# Patient Record
Sex: MALE | NOT HISPANIC OR LATINO | Employment: FULL TIME | URBAN - METROPOLITAN AREA
[De-identification: names, ages, dates, MRNs, and addresses within clinical notes are randomized per-mention and may not be internally consistent; named-entity substitution may affect disease eponyms.]

---

## 2024-11-25 ENCOUNTER — HOSPITAL ENCOUNTER (OUTPATIENT)
Facility: HOSPITAL | Age: 64
Setting detail: OBSERVATION
Discharge: HOME | End: 2024-11-27
Attending: STUDENT IN AN ORGANIZED HEALTH CARE EDUCATION/TRAINING PROGRAM | Admitting: INTERNAL MEDICINE
Payer: COMMERCIAL

## 2024-11-25 ENCOUNTER — APPOINTMENT (OUTPATIENT)
Dept: RADIOLOGY | Facility: HOSPITAL | Age: 64
End: 2024-11-25
Payer: COMMERCIAL

## 2024-11-25 ENCOUNTER — APPOINTMENT (OUTPATIENT)
Dept: CARDIOLOGY | Facility: HOSPITAL | Age: 64
End: 2024-11-25
Payer: COMMERCIAL

## 2024-11-25 DIAGNOSIS — R42 DIZZINESS: ICD-10-CM

## 2024-11-25 DIAGNOSIS — Z78.9 ALCOHOL USE: ICD-10-CM

## 2024-11-25 DIAGNOSIS — R42 LIGHTHEADEDNESS: Primary | ICD-10-CM

## 2024-11-25 DIAGNOSIS — E87.1 HYPONATREMIA: ICD-10-CM

## 2024-11-25 DIAGNOSIS — R03.0 BLOOD PRESSURE ELEVATED WITHOUT HISTORY OF HTN: ICD-10-CM

## 2024-11-25 LAB
ALBUMIN SERPL BCP-MCNC: 4.1 G/DL (ref 3.4–5)
ALP SERPL-CCNC: 61 U/L (ref 33–136)
ALT SERPL W P-5'-P-CCNC: 14 U/L (ref 10–52)
ANION GAP SERPL CALC-SCNC: 16 MMOL/L (ref 10–20)
AST SERPL W P-5'-P-CCNC: 20 U/L (ref 9–39)
ATRIAL RATE: 66 BPM
ATRIAL RATE: 68 BPM
ATRIAL RATE: 69 BPM
ATRIAL RATE: 77 BPM
BASOPHILS # BLD AUTO: 0.04 X10*3/UL (ref 0–0.1)
BASOPHILS NFR BLD AUTO: 0.6 %
BILIRUB SERPL-MCNC: 1.2 MG/DL (ref 0–1.2)
BNP SERPL-MCNC: 71 PG/ML (ref 0–99)
BUN SERPL-MCNC: 8 MG/DL (ref 6–23)
CALCIUM SERPL-MCNC: 8.6 MG/DL (ref 8.6–10.3)
CARDIAC TROPONIN I PNL SERPL HS: 7 NG/L (ref 0–20)
CARDIAC TROPONIN I PNL SERPL HS: 8 NG/L (ref 0–20)
CHLORIDE SERPL-SCNC: 100 MMOL/L (ref 98–107)
CO2 SERPL-SCNC: 20 MMOL/L (ref 21–32)
CREAT SERPL-MCNC: 0.69 MG/DL (ref 0.5–1.3)
EGFRCR SERPLBLD CKD-EPI 2021: >90 ML/MIN/1.73M*2
EOSINOPHIL # BLD AUTO: 0.01 X10*3/UL (ref 0–0.7)
EOSINOPHIL NFR BLD AUTO: 0.2 %
ERYTHROCYTE [DISTWIDTH] IN BLOOD BY AUTOMATED COUNT: 11.8 % (ref 11.5–14.5)
GLUCOSE SERPL-MCNC: 135 MG/DL (ref 74–99)
HCT VFR BLD AUTO: 41.2 % (ref 41–52)
HGB BLD-MCNC: 14.7 G/DL (ref 13.5–17.5)
HOLD SPECIMEN: NORMAL
IMM GRANULOCYTES # BLD AUTO: 0.01 X10*3/UL (ref 0–0.7)
IMM GRANULOCYTES NFR BLD AUTO: 0.2 % (ref 0–0.9)
LYMPHOCYTES # BLD AUTO: 0.84 X10*3/UL (ref 1.2–4.8)
LYMPHOCYTES NFR BLD AUTO: 12.9 %
MAGNESIUM SERPL-MCNC: 1.67 MG/DL (ref 1.6–2.4)
MCH RBC QN AUTO: 31.5 PG (ref 26–34)
MCHC RBC AUTO-ENTMCNC: 35.7 G/DL (ref 32–36)
MCV RBC AUTO: 88 FL (ref 80–100)
MONOCYTES # BLD AUTO: 0.43 X10*3/UL (ref 0.1–1)
MONOCYTES NFR BLD AUTO: 6.6 %
NEUTROPHILS # BLD AUTO: 5.16 X10*3/UL (ref 1.2–7.7)
NEUTROPHILS NFR BLD AUTO: 79.5 %
NRBC BLD-RTO: 0 /100 WBCS (ref 0–0)
P AXIS: 22 DEGREES
P AXIS: 24 DEGREES
P AXIS: 40 DEGREES
P AXIS: 49 DEGREES
P OFFSET: 184 MS
P OFFSET: 193 MS
P OFFSET: 198 MS
P OFFSET: 199 MS
P ONSET: 121 MS
P ONSET: 130 MS
P ONSET: 134 MS
P ONSET: 139 MS
PLATELET # BLD AUTO: 240 X10*3/UL (ref 150–450)
POTASSIUM SERPL-SCNC: 3.6 MMOL/L (ref 3.5–5.3)
PR INTERVAL: 166 MS
PR INTERVAL: 170 MS
PR INTERVAL: 174 MS
PR INTERVAL: 178 MS
PROT SERPL-MCNC: 6 G/DL (ref 6.4–8.2)
Q ONSET: 208 MS
Q ONSET: 219 MS
Q ONSET: 219 MS
Q ONSET: 222 MS
QRS COUNT: 11 BEATS
QRS COUNT: 11 BEATS
QRS COUNT: 12 BEATS
QRS COUNT: 13 BEATS
QRS DURATION: 102 MS
QRS DURATION: 108 MS
QRS DURATION: 110 MS
QRS DURATION: 112 MS
QT INTERVAL: 406 MS
QT INTERVAL: 430 MS
QT INTERVAL: 444 MS
QT INTERVAL: 466 MS
QTC CALCULATION(BAZETT): 457 MS
QTC CALCULATION(BAZETT): 459 MS
QTC CALCULATION(BAZETT): 465 MS
QTC CALCULATION(BAZETT): 499 MS
QTC FREDERICIA: 441 MS
QTC FREDERICIA: 448 MS
QTC FREDERICIA: 458 MS
QTC FREDERICIA: 488 MS
R AXIS: -29 DEGREES
R AXIS: -32 DEGREES
R AXIS: -33 DEGREES
R AXIS: -34 DEGREES
RBC # BLD AUTO: 4.67 X10*6/UL (ref 4.5–5.9)
SODIUM SERPL-SCNC: 132 MMOL/L (ref 136–145)
T AXIS: -20 DEGREES
T AXIS: -28 DEGREES
T AXIS: -28 DEGREES
T AXIS: -47 DEGREES
T OFFSET: 425 MS
T OFFSET: 434 MS
T OFFSET: 441 MS
T OFFSET: 441 MS
TSH SERPL-ACNC: 1.38 MIU/L (ref 0.44–3.98)
VENTRICULAR RATE: 66 BPM
VENTRICULAR RATE: 68 BPM
VENTRICULAR RATE: 69 BPM
VENTRICULAR RATE: 77 BPM
WBC # BLD AUTO: 6.5 X10*3/UL (ref 4.4–11.3)

## 2024-11-25 PROCEDURE — 96375 TX/PRO/DX INJ NEW DRUG ADDON: CPT

## 2024-11-25 PROCEDURE — 2500000001 HC RX 250 WO HCPCS SELF ADMINISTERED DRUGS (ALT 637 FOR MEDICARE OP)

## 2024-11-25 PROCEDURE — 80053 COMPREHEN METABOLIC PANEL: CPT

## 2024-11-25 PROCEDURE — 84484 ASSAY OF TROPONIN QUANT: CPT

## 2024-11-25 PROCEDURE — 83735 ASSAY OF MAGNESIUM: CPT

## 2024-11-25 PROCEDURE — 71045 X-RAY EXAM CHEST 1 VIEW: CPT

## 2024-11-25 PROCEDURE — 93005 ELECTROCARDIOGRAM TRACING: CPT

## 2024-11-25 PROCEDURE — 71045 X-RAY EXAM CHEST 1 VIEW: CPT | Performed by: RADIOLOGY

## 2024-11-25 PROCEDURE — 2500000005 HC RX 250 GENERAL PHARMACY W/O HCPCS

## 2024-11-25 PROCEDURE — 83880 ASSAY OF NATRIURETIC PEPTIDE: CPT

## 2024-11-25 PROCEDURE — 2500000001 HC RX 250 WO HCPCS SELF ADMINISTERED DRUGS (ALT 637 FOR MEDICARE OP): Performed by: INTERNAL MEDICINE

## 2024-11-25 PROCEDURE — G0378 HOSPITAL OBSERVATION PER HR: HCPCS

## 2024-11-25 PROCEDURE — 99285 EMERGENCY DEPT VISIT HI MDM: CPT | Performed by: STUDENT IN AN ORGANIZED HEALTH CARE EDUCATION/TRAINING PROGRAM

## 2024-11-25 PROCEDURE — 2500000002 HC RX 250 W HCPCS SELF ADMINISTERED DRUGS (ALT 637 FOR MEDICARE OP, ALT 636 FOR OP/ED)

## 2024-11-25 PROCEDURE — 36415 COLL VENOUS BLD VENIPUNCTURE: CPT

## 2024-11-25 PROCEDURE — 85025 COMPLETE CBC W/AUTO DIFF WBC: CPT

## 2024-11-25 PROCEDURE — 84443 ASSAY THYROID STIM HORMONE: CPT

## 2024-11-25 PROCEDURE — 99222 1ST HOSP IP/OBS MODERATE 55: CPT

## 2024-11-25 PROCEDURE — 96365 THER/PROPH/DIAG IV INF INIT: CPT

## 2024-11-25 PROCEDURE — 96372 THER/PROPH/DIAG INJ SC/IM: CPT | Mod: 59

## 2024-11-25 PROCEDURE — 99285 EMERGENCY DEPT VISIT HI MDM: CPT | Mod: 25

## 2024-11-25 PROCEDURE — 70450 CT HEAD/BRAIN W/O DYE: CPT

## 2024-11-25 PROCEDURE — 93010 ELECTROCARDIOGRAM REPORT: CPT | Performed by: STUDENT IN AN ORGANIZED HEALTH CARE EDUCATION/TRAINING PROGRAM

## 2024-11-25 PROCEDURE — 2500000004 HC RX 250 GENERAL PHARMACY W/ HCPCS (ALT 636 FOR OP/ED)

## 2024-11-25 PROCEDURE — 70450 CT HEAD/BRAIN W/O DYE: CPT | Performed by: RADIOLOGY

## 2024-11-25 RX ORDER — MULTIVIT-MIN/IRON FUM/FOLIC AC 7.5 MG-4
1 TABLET ORAL DAILY
Status: DISCONTINUED | OUTPATIENT
Start: 2024-11-25 | End: 2024-11-27 | Stop reason: HOSPADM

## 2024-11-25 RX ORDER — FAMOTIDINE 10 MG/ML
20 INJECTION INTRAVENOUS ONCE
Status: COMPLETED | OUTPATIENT
Start: 2024-11-25 | End: 2024-11-25

## 2024-11-25 RX ORDER — PSYLLIUM HUSK 0.4 G
1 CAPSULE ORAL DAILY
COMMUNITY

## 2024-11-25 RX ORDER — ENOXAPARIN SODIUM 100 MG/ML
40 INJECTION SUBCUTANEOUS EVERY 24 HOURS
Status: DISCONTINUED | OUTPATIENT
Start: 2024-11-25 | End: 2024-11-27 | Stop reason: HOSPADM

## 2024-11-25 RX ORDER — NAPROXEN SODIUM 220 MG/1
324 TABLET, FILM COATED ORAL ONCE
Status: COMPLETED | OUTPATIENT
Start: 2024-11-25 | End: 2024-11-25

## 2024-11-25 RX ORDER — ONDANSETRON HYDROCHLORIDE 2 MG/ML
4 INJECTION, SOLUTION INTRAVENOUS ONCE
Status: COMPLETED | OUTPATIENT
Start: 2024-11-25 | End: 2024-11-25

## 2024-11-25 RX ORDER — LANOLIN ALCOHOL/MO/W.PET/CERES
100 CREAM (GRAM) TOPICAL DAILY
Status: DISCONTINUED | OUTPATIENT
Start: 2024-11-25 | End: 2024-11-27 | Stop reason: HOSPADM

## 2024-11-25 RX ORDER — LANOLIN ALCOHOL/MO/W.PET/CERES
250 CREAM (GRAM) TOPICAL ONCE
Status: COMPLETED | OUTPATIENT
Start: 2024-11-25 | End: 2024-11-25

## 2024-11-25 RX ORDER — ALUMINUM HYDROXIDE, MAGNESIUM HYDROXIDE, AND SIMETHICONE 1200; 120; 1200 MG/30ML; MG/30ML; MG/30ML
30 SUSPENSION ORAL ONCE
Status: COMPLETED | OUTPATIENT
Start: 2024-11-25 | End: 2024-11-25

## 2024-11-25 RX ORDER — MECLIZINE HYDROCHLORIDE 25 MG/1
25 TABLET ORAL ONCE
Status: COMPLETED | OUTPATIENT
Start: 2024-11-25 | End: 2024-11-25

## 2024-11-25 RX ORDER — ACETAMINOPHEN 325 MG/1
975 TABLET ORAL ONCE
Status: DISCONTINUED | OUTPATIENT
Start: 2024-11-25 | End: 2024-11-25

## 2024-11-25 RX ORDER — LIDOCAINE HYDROCHLORIDE 20 MG/ML
15 SOLUTION OROPHARYNGEAL ONCE
Status: COMPLETED | OUTPATIENT
Start: 2024-11-25 | End: 2024-11-25

## 2024-11-25 RX ORDER — FOLIC ACID 1 MG/1
1 TABLET ORAL DAILY
Status: DISCONTINUED | OUTPATIENT
Start: 2024-11-25 | End: 2024-11-27 | Stop reason: HOSPADM

## 2024-11-25 ASSESSMENT — COGNITIVE AND FUNCTIONAL STATUS - GENERAL
MOBILITY SCORE: 24
MOBILITY SCORE: 24
DAILY ACTIVITIY SCORE: 24
DAILY ACTIVITIY SCORE: 24
MOBILITY SCORE: 24
PATIENT BASELINE BEDBOUND: NO

## 2024-11-25 ASSESSMENT — ACTIVITIES OF DAILY LIVING (ADL)
DRESSING YOURSELF: INDEPENDENT
BATHING: INDEPENDENT
HEARING - RIGHT EAR: FUNCTIONAL
ADEQUATE_TO_COMPLETE_ADL: YES
ADEQUATE_TO_COMPLETE_ADL: YES
HEARING - RIGHT EAR: FUNCTIONAL
PATIENT'S MEMORY ADEQUATE TO SAFELY COMPLETE DAILY ACTIVITIES?: YES
TOILETING: INDEPENDENT
TOILETING: INDEPENDENT
PATIENT'S MEMORY ADEQUATE TO SAFELY COMPLETE DAILY ACTIVITIES?: YES
FEEDING YOURSELF: INDEPENDENT
WALKS IN HOME: INDEPENDENT
DRESSING YOURSELF: INDEPENDENT
FEEDING YOURSELF: INDEPENDENT
GROOMING: INDEPENDENT
WALKS IN HOME: INDEPENDENT
HEARING - LEFT EAR: FUNCTIONAL
HEARING - LEFT EAR: FUNCTIONAL
BATHING: INDEPENDENT
JUDGMENT_ADEQUATE_SAFELY_COMPLETE_DAILY_ACTIVITIES: YES
JUDGMENT_ADEQUATE_SAFELY_COMPLETE_DAILY_ACTIVITIES: YES
GROOMING: INDEPENDENT

## 2024-11-25 ASSESSMENT — PAIN DESCRIPTION - LOCATION: LOCATION: CHEST

## 2024-11-25 ASSESSMENT — COLUMBIA-SUICIDE SEVERITY RATING SCALE - C-SSRS
1. IN THE PAST MONTH, HAVE YOU WISHED YOU WERE DEAD OR WISHED YOU COULD GO TO SLEEP AND NOT WAKE UP?: NO
2. HAVE YOU ACTUALLY HAD ANY THOUGHTS OF KILLING YOURSELF?: NO
6. HAVE YOU EVER DONE ANYTHING, STARTED TO DO ANYTHING, OR PREPARED TO DO ANYTHING TO END YOUR LIFE?: NO

## 2024-11-25 ASSESSMENT — PAIN SCALES - GENERAL
PAINLEVEL_OUTOF10: 1
PAINLEVEL_OUTOF10: 5 - MODERATE PAIN
PAINLEVEL_OUTOF10: 0 - NO PAIN

## 2024-11-25 ASSESSMENT — PAIN - FUNCTIONAL ASSESSMENT: PAIN_FUNCTIONAL_ASSESSMENT: 0-10

## 2024-11-25 NOTE — ED PROVIDER NOTES
EMERGENCY DEPARTMENT ENCOUNTER      Pt Name: Donnie Nogueira  MRN: [id number]  Birthdate 1960  Date of evaluation: 11/25/2024  Provider: David Enciso DO    CHIEF COMPLAINT       Chief Complaint   Patient presents with    Dizziness     Blurred vision         HISTORY OF PRESENT ILLNESS    HPI    64-year-old male with past medical history of reflux and heavy alcohol use reporting approximately 6 beers per day who has not seen a doctor in over a decade presenting to the Emergency Department for evaluation of dizziness and blurred vision.  Patient states his symptoms started yesterday morning at 4 in the morning when he woke up to go to the bathroom.  Patient states he thought his symptoms would improved as he has had an episode like this a couple years ago that resolved on its own.  Last alcoholic beverage was Saturday evening.  Reports dizziness as sometimes vertiginous and sometimes presyncopal with no chest pain, shortness of breath, headache, neck stiffness, lower extremity pain or swelling.  Denies abdominal pain but does report nausea.  No black or bloody stools no hemoptysis or hematemesis.  Patient states he has not been sick and denies experiencing any nasal congestion, cough.  No recent falls.  Patient is not on blood thinners.    Nursing Notes were reviewed.    PAST MEDICAL HISTORY   History reviewed. No pertinent past medical history.      SURGICAL HISTORY     History reviewed. No pertinent surgical history.      CURRENT MEDICATIONS       Current Discharge Medication List        CONTINUE these medications which have NOT CHANGED    Details   psyllium (Metamucil) 0.4 gram capsule Take 1 capsule by mouth once daily.             ALLERGIES     Patient has no known allergies.    FAMILY HISTORY     No family history on file.       SOCIAL HISTORY       Social History     Socioeconomic History    Marital status:    Tobacco Use    Smoking status: Former     Types: Cigarettes    Smokeless tobacco: Never    Substance and Sexual Activity    Alcohol use: Yes     Alcohol/week: 6.0 standard drinks of alcohol     Types: 6 Cans of beer per week    Drug use: Yes     Types: Marijuana       SCREENINGS                        PHYSICAL EXAM    (up to 7 for level 4, 8 or more for level 5)     ED Triage Vitals [11/25/24 0744]   Temperature Heart Rate Respirations BP   36.2 °C (97.2 °F) 74 18 (!) 192/83      Pulse Ox Temp Source Heart Rate Source Patient Position   99 % Temporal Monitor --      BP Location FiO2 (%)     -- --       Physical Exam  Vitals and nursing note reviewed.   Constitutional:       General: He is not in acute distress.     Appearance: Normal appearance. He is not ill-appearing, toxic-appearing or diaphoretic.   HENT:      Head: Normocephalic and atraumatic.      Right Ear: External ear normal.      Left Ear: External ear normal.      Nose: Nose normal.      Mouth/Throat:      Pharynx: Oropharynx is clear.   Eyes:      Conjunctiva/sclera: Conjunctivae normal.   Cardiovascular:      Rate and Rhythm: Normal rate and regular rhythm.      Pulses: Normal pulses.      Heart sounds: Normal heart sounds.   Pulmonary:      Effort: Pulmonary effort is normal.      Breath sounds: Normal breath sounds.   Abdominal:      General: Abdomen is flat. There is no distension.      Palpations: Abdomen is soft.      Tenderness: There is no abdominal tenderness. There is no guarding or rebound.   Musculoskeletal:         General: Normal range of motion.      Cervical back: Normal range of motion and neck supple.   Skin:     General: Skin is warm and dry.      Capillary Refill: Capillary refill takes less than 2 seconds.   Neurological:      Mental Status: He is alert and oriented to person, place, and time.      Comments: Rightward beating nystagmus that is not fatigable with positive horizontal head impulse test and absent skew.  Cranial nerves II through XII intact.  No ataxia or sensory changes.   Psychiatric:         Mood and  Affect: Mood normal.         Behavior: Behavior normal.          DIAGNOSTIC RESULTS     LABS:  Labs Reviewed   CBC WITH AUTO DIFFERENTIAL - Abnormal       Result Value    WBC 6.5      nRBC 0.0      RBC 4.67      Hemoglobin 14.7      Hematocrit 41.2      MCV 88      MCH 31.5      MCHC 35.7      RDW 11.8      Platelets 240      Neutrophils % 79.5      Immature Granulocytes %, Automated 0.2      Lymphocytes % 12.9      Monocytes % 6.6      Eosinophils % 0.2      Basophils % 0.6      Neutrophils Absolute 5.16      Immature Granulocytes Absolute, Automated 0.01      Lymphocytes Absolute 0.84 (*)     Monocytes Absolute 0.43      Eosinophils Absolute 0.01      Basophils Absolute 0.04     COMPREHENSIVE METABOLIC PANEL - Abnormal    Glucose 135 (*)     Sodium 132 (*)     Potassium 3.6      Chloride 100      Bicarbonate 20 (*)     Anion Gap 16      Urea Nitrogen 8      Creatinine 0.69      eGFR >90      Calcium 8.6      Albumin 4.1      Alkaline Phosphatase 61      Total Protein 6.0 (*)     AST 20      Bilirubin, Total 1.2      ALT 14     MAGNESIUM - Normal    Magnesium 1.67     TSH WITH REFLEX TO FREE T4 IF ABNORMAL - Normal    Thyroid Stimulating Hormone 1.38      Narrative:     TSH testing is performed using different testing methodology at [facility] than at other [facility]. Direct result comparisons should only be made within the same method.     SERIAL TROPONIN-INITIAL - Normal    Troponin I, High Sensitivity 7      Narrative:     Less than 99th percentile of normal range cutoff-  Female and children under 18 years old <14 ng/L; Male <21 ng/L: Negative  Repeat testing should be performed if clinically indicated.     Female and children under 18 years old 14-50 ng/L; Male 21-50 ng/L:  Consistent with possible cardiac damage and possible increased clinical   risk. Serial measurements may help to assess extent of myocardial damage.     >50 ng/L: Consistent with cardiac damage, increased clinical risk  and  myocardial infarction. Serial measurements may help assess extent of   myocardial damage.      NOTE: Children less than 1 year old may have higher baseline troponin   levels and results should be interpreted in conjunction with the overall   clinical context.     NOTE: Troponin I testing is performed using a different   testing methodology at [facility] than at other   [facility]. Direct result comparisons should only   be made within the same method.   SERIAL TROPONIN, 1 HOUR - Normal    Troponin I, High Sensitivity 8      Narrative:     Less than 99th percentile of normal range cutoff-  Female and children under 18 years old <14 ng/L; Male <21 ng/L: Negative  Repeat testing should be performed if clinically indicated.     Female and children under 18 years old 14-50 ng/L; Male 21-50 ng/L:  Consistent with possible cardiac damage and possible increased clinical   risk. Serial measurements may help to assess extent of myocardial damage.     >50 ng/L: Consistent with cardiac damage, increased clinical risk and  myocardial infarction. Serial measurements may help assess extent of   myocardial damage.      NOTE: Children less than 1 year old may have higher baseline troponin   levels and results should be interpreted in conjunction with the overall   clinical context.     NOTE: Troponin I testing is performed using a different   testing methodology at [facility] than at other   [facility]. Direct result comparisons should only   be made within the same method.   B-TYPE NATRIURETIC PEPTIDE - Normal    BNP 71      Narrative:        <100 pg/mL - Heart failure unlikely  100-299 pg/mL - Intermediate probability of acute heart                  failure exacerbation. Correlate with clinical                  context and patient history.    >=300 pg/mL - Heart Failure likely. Correlate with clinical                  context and patient history.    BNP testing is performed using  different testing methodology at [facility] than at other [facility]. Direct result comparisons should only be made within the same method.      TROPONIN SERIES- (INITIAL, 1 HR)    Narrative:     The following orders were created for panel order Troponin I Series, High Sensitivity (0, 1 HR).  Procedure                               Abnormality         Status                     ---------                               -----------         ------                     Troponin I, High Sensiti...[[id number]]  Normal              Final result               Troponin, High Sensitivi...[[id number]]  Normal              Final result                 Please view results for these tests on the individual orders.       All other labs were within normal range or not returned as of this dictation.    Imaging  CT head wo IV contrast   Final Result   No acute intracranial hemorrhage or mass-effect.        Partially included small possible mucous retention cyst or polyp in   the right maxillary sinus.        MACRO:   None.        Signed by: Evette Araiza 11/25/2024 8:42 AM   Dictation workstation:   [id number]      XR chest 1 view   Final Result   Small left basilar nodule or nipple shadow. PA and lateral chest   x-ray with nipple markers recommended when the patient is able.        MACRO:   None.        Signed by: Evette Araiza 11/25/2024 8:38 AM   Dictation workstation:   [id number]           Procedures  Procedures     EMERGENCY DEPARTMENT COURSE/MDM:     ED Course as of 11/25/24 1716   Mon Nov 25, 2024   0814 ECG 12 lead  ECG shows vent rate 66, left axis deviation, QRS mildly prolonged at 110, normal QT intervals.  No significant ST segment elevation or depression.  T wave inversions noted in lead III, aVF  No prior ECGs to compare. [FN]   0847 CT head wo IV contrast [FN]   0931 HEART Score High = 4  History is slightly suspicion given complaint of heartburn, age 64, LVH on ECG, obesity and hypertension in the  ED.   [FN]   1006 Attending attestation note  64-year-old male history of alcohol use disorder, does not see physicians presenting with lightheadedness/vertigo.  Also reported sensation of heartburn at rest.  Nonpleuritic, nonexertional.  No leg edema/swelling  Exam is notable for hypertension, afebrile, not in acute distress.  Abdomen nontender, CTAB, RRR  Neuro: EOMI, PERRL, intact sensation to touch in all facial areas, no obvious motor palsy of the face, able to open mouth and say Ahh with appropriate palate elevation. Can stick out tongue. Able to shrug shoulders. Able to  my fingers with adequate strength. Able to push me away, pull me in without weakness. Sensation grossly intact in 4 extremities. 5/5 strength in 4 extremities. Bilateral finger to nose without dysmetria, bilateral rapid alternating movements intact. Ambulates without difficulty.  MDM  Rule out electrolyte/metabolic abnormalities (hyper/hypoglycemia, hyper/hypokalemia, hyper/hyponatremia, etc.) electrolyte abnormalities concerning for adrenal dysfunction (sodium/potassium abnormalities), JUVENTINO, anemia, leukocytosis.  Although patient has a history of alcohol use disorder he does not have signs of jaundice or asterixis on his exam.  Not concern for hepatic encephalopathy given his clinical exam  Given his complaint of lightheadedness will evaluate for ACS, arrhythmia, pulmonary edema, pneumothorax  Rule out thyroid disease [FN]      ED Course User Index  [FN] Nikunj Orlando MD         Diagnoses as of 11/25/24 1716   Lightheadedness   Blood pressure elevated without history of HTN   Alcohol use   Hyponatremia        Medical Decision Making    64-year-old male with past medical history of reflux and heavy alcohol use reporting approximately 6 beers per day who has not seen a doctor in over a decade presenting to the Emergency Department for evaluation of dizziness and blurred vision.  Patient is markedly hypertensive with a pressure 192/83 but  vitals otherwise WNL.  Physical exam significant for rightward beating nystagmus with positive horizontal impulse test and without other focal neuro deficits.  Patient is not displaying signs or symptoms of acute alcohol withdrawal at this time.  Cardiac workup, TSH, CT head without IV contrast ordered as well as Zofran for nausea.    EKG: Sinus rhythm with occasional PVCs and LVH.  Left axis deviation ventricular rate of 77 bpm, CT interval 166 ms,  ms, QTc 459 ms.  T wave inversions noted in leads III and aVF.  Otherwise no evidence of acute ischemic changes noted.  No prior EKGs available for comparison.    EKG2: Normal sinus rhythm with a ventricular rate of 66 bpm, CT interval 170 ms,  ms, QTc 465 ms.  T wave inversions noted in leads III and aVF.  LVH.     EKG 3: Normal sinus rhythm with a ventricular rate of 69 bpm, left axis deviation and LVH.  CT interval 174 ms,  ms, QTc 499 ms.  T wave inversions noted in leads II, III, and aVF with nonspecific ST change noted in lead V2.    Labs significant for normal chloride neck hyponatremia at 132 with a bicarb of 20.  Labs otherwise unremarkable for acute pathology.  Chest x-ray shows small left basilar nodule but otherwise no acute pathology.  CT head without IV contrast unremarkable.  Meclizine, thiamine, and folate ordered given patient's alcohol use and vertigo symptoms.  Pepcid, viscous lidocaine and Mylanta ordered for reflux symptoms.  Plan for admission to medicine given heart score of 4, hypertension, abnormal EKG findings, vertigo and poor outpatient follow-up.    Patient and or family in agreement and understanding of treatment plan.  All questions answered.      I reviewed the case with the attending ED physician. The attending ED physician agrees with the plan. Patient and/or patient´s representative was counseled regarding labs, imaging, likely diagnosis, and plan. All questions were answered.    ED Medications administered this  visit:    Medications   enoxaparin (Lovenox) syringe 40 mg (40 mg subcutaneous Given 11/25/24 1340)   ondansetron (Zofran) injection 4 mg (4 mg intravenous Given 11/25/24 0808)   famotidine PF (Pepcid) injection 20 mg (20 mg intravenous Given 11/25/24 0808)   alum-mag hydroxide-simeth (Mylanta) 200-200-20 mg/5 mL oral suspension 30 mL (30 mL oral Given 11/25/24 0924)   lidocaine (Xylocaine) 2 % mouth solution 15 mL (15 mL oral Given 11/25/24 0927)   meclizine (Antivert) tablet 25 mg (25 mg oral Given 11/25/24 0931)   lactated Ringer's bolus 1,000 mL (0 mL intravenous Stopped 11/25/24 1057)   thiamine (Vitamin B-1) tablet 250 mg (250 mg oral Given 11/25/24 0928)   folic acid 1 mg in dextrose 5% 50 mL IV (0 mg intravenous Stopped 11/25/24 1057)   aspirin chewable tablet 324 mg (324 mg oral Given 11/25/24 1059)       New Prescriptions from this visit:    Current Discharge Medication List          Follow-up:  No follow-up provider specified.      Final Impression:   1. Lightheadedness    2. Blood pressure elevated without history of HTN    3. Alcohol use    4. Hyponatremia          (Please note that portions of this note were completed with a voice recognition program.  Efforts were made to edit the dictations but occasionally words are mis-transcribed.)     David Enciso, DO  Resident  11/25/24 4217

## 2024-11-25 NOTE — PROGRESS NOTES
Subjective   Patient ID: Donnie Nogueira is a 64 y.o. male who presents for No chief complaint on file..    HPI[unfilled]  Patient presents in the office today for a follow up on hypertension.   Patient was seen on *** and blood pressure was ***.   Today's blood pressure was taken on the *** arm and was ***.   Denies chest pain,SOB, swelling, headaches, lightheadedness or dizziness.   Patient does/ does not check blood pressure at home.   Patient states it is averaging ***/*** at home.  Currently taking ***.   The patient's allergies, medications, and past medical/surgical/family history were reviewed with them today and updated as indicated.  ---  Additional HPI  ***  Review of Systems   Objective   Vital Signs: There were no vitals taken for this visit.    Physical Exam   POCT today: No results found for this visit on 11/26/24.     Assessment & Plan  No diagnosis found.  Reviewed treatment options and health maintenance. Take medications as prescribed. We will contact you with the results of any ordered testing; please send a [employer] message or call the office if you do not hear from us. Follow up in the office in *** months/as previously discussed with your PCP. Return sooner if symptoms do not resolve as expected.     Angeline Cobian, APRN-CNP, DNP, CCRN  Family Nurse Practitioner  [facility]

## 2024-11-25 NOTE — H&P
Internal Medicine    Admission H&P      Patient Donnie Nogueira PCP No primary care provider on file.    MRN [id number]  Admission Date 11/25/2024      Chief Complaint/Reason for Admission:  Donnie is a 64 y.o. male who presented today with  dizziness    Subjective    Subjective   History of Presenting Illness  Mr. Donnie Nogueira is a 64 y.o. male with limited past medical history who presents to the ED with dizziness and blurred vision.  He states symptoms started Saturday night, endorses room spinning sensation.  He states the symptoms are worse upon moving, and improve when sitting down.  He denies any recent illness, but does endorse left ear fullness.  He states he had similar symptoms a few years ago that spontaneously resolved.  He states he drinks 6 standard light beers daily, but denies any history of alcohol withdrawal or admissions to the hospital for withdrawal-like symptoms or seizures or intubation secondary to alcohol use.  He denies any chest pain, shortness of breath, nausea, vomiting, abdominal pain, urinary symptoms, or leg swelling.  He states his dizziness improved on receiving meclizine in the ED.    ED course:  V/S: 97.2 °F, 24 HR, 18 RR, 192/83, 99% RA  Labs: CMP showed mild hyponatremia 132, low bicarb 20.  CBC grossly unremarkable.  Troponin x 2 negative.  EKG: Normal sinus rhythm with vent rate 69 bpm, QTc 499.  T wave inversion seen in leads III and aVF.  Imaging: CXR showed no acute findings  Intervention: He was loaded with aspirin.  Given GI cocktail for acid reflux.  He was given meclizine and Zofran for dizziness.    Past Medical History  No pertinent past medical hx    Past Surgical History   No pertinent surgical hx    Social History  Current smoker, Drinks standard 6 pack of beer daily, marijuana use    Family History  Reviewed and noncontributory to today's presentation unless otherwise noted.    Allergies:  As documented in EMR were reviewed and discussed with patient.      CODE STATUS:  "Full Code        Home Medication:  Prior to Admission medications    Medication Sig Start Date End Date Taking? Authorizing Provider   psyllium (Metamucil) 0.4 gram capsule Take 1 capsule by mouth once daily.   Yes Historical Provider, MD          Review of System:  Review of Systems  See HPI    Objective    Objective   Vital Signs:  Visit Vitals  BP (!) 163/93   Pulse 66   Temp 36.2 °C (97.2 °F) (Temporal)   Resp 17   Ht 1.727 m (5' 8\")   Wt 93 kg (205 lb)   SpO2 96%   BMI 31.17 kg/m²   Smoking Status Former   BSA 2.11 m²        Physical Examination:  General: Patient does not appear to be in any acute distress, alert, awake  Head: Normocephalic, Atraumatic   Eyes: Normal external exam, EOMI  ENT: Normal external inspection of ears and nose. Oropharynx normal.  Cardiovascular: RRR, S1/S2, no murmurs, rubs, or gallops, radial pulses +2  Pulmonary: CTAB, no respiratory distress.  Abdomen: +BS, soft, non-tender, nondistended, no guarding or rebound, no masses noted  Neuro: A&O x3, CN intact, no focal deficits, strength and sensation intact bilaterally, positive right sided horizontal nystagmus, H2S and F2N intact. NIH 0  MSK: No joint swelling, normal movements of all extremities. Passive ROM intact  Extremities: No edema appreciated in lower extremities bilaterally, no cyanosis  Skin- Warm. Dry. No lesions, contusions, or erythema.  Psychiatric: Judgment intact. Appropriate mood and behavior      Laboratory Data:  Results for orders placed or performed during the hospital encounter of 11/25/24 (from the past 24 hours)   Light Blue Top   Result Value Ref Range    Extra Tube Hold for add-ons.    CBC and Auto Differential   Result Value Ref Range    WBC 6.5 4.4 - 11.3 x10*3/uL    nRBC 0.0 0.0 - 0.0 /100 WBCs    RBC 4.67 4.50 - 5.90 x10*6/uL    Hemoglobin 14.7 13.5 - 17.5 g/dL    Hematocrit 41.2 41.0 - 52.0 %    MCV 88 80 - 100 fL    MCH 31.5 26.0 - 34.0 pg    MCHC 35.7 32.0 - 36.0 g/dL    RDW 11.8 11.5 - 14.5 %    " Platelets 240 150 - 450 x10*3/uL    Neutrophils % 79.5 40.0 - 80.0 %    Immature Granulocytes %, Automated 0.2 0.0 - 0.9 %    Lymphocytes % 12.9 13.0 - 44.0 %    Monocytes % 6.6 2.0 - 10.0 %    Eosinophils % 0.2 0.0 - 6.0 %    Basophils % 0.6 0.0 - 2.0 %    Neutrophils Absolute 5.16 1.20 - 7.70 x10*3/uL    Immature Granulocytes Absolute, Automated 0.01 0.00 - 0.70 x10*3/uL    Lymphocytes Absolute 0.84 (L) 1.20 - 4.80 x10*3/uL    Monocytes Absolute 0.43 0.10 - 1.00 x10*3/uL    Eosinophils Absolute 0.01 0.00 - 0.70 x10*3/uL    Basophils Absolute 0.04 0.00 - 0.10 x10*3/uL   Comprehensive Metabolic Panel   Result Value Ref Range    Glucose 135 (H) 74 - 99 mg/dL    Sodium 132 (L) 136 - 145 mmol/L    Potassium 3.6 3.5 - 5.3 mmol/L    Chloride 100 98 - 107 mmol/L    Bicarbonate 20 (L) 21 - 32 mmol/L    Anion Gap 16 10 - 20 mmol/L    Urea Nitrogen 8 6 - 23 mg/dL    Creatinine 0.69 0.50 - 1.30 mg/dL    eGFR >90 >60 mL/min/1.73m*2    Calcium 8.6 8.6 - 10.3 mg/dL    Albumin 4.1 3.4 - 5.0 g/dL    Alkaline Phosphatase 61 33 - 136 U/L    Total Protein 6.0 (L) 6.4 - 8.2 g/dL    AST 20 9 - 39 U/L    Bilirubin, Total 1.2 0.0 - 1.2 mg/dL    ALT 14 10 - 52 U/L   Magnesium   Result Value Ref Range    Magnesium 1.67 1.60 - 2.40 mg/dL   TSH with reflex to Free T4 if abnormal   Result Value Ref Range    Thyroid Stimulating Hormone 1.38 0.44 - 3.98 mIU/L   Troponin I, High Sensitivity, Initial   Result Value Ref Range    Troponin I, High Sensitivity 7 0 - 20 ng/L   B-type natriuretic peptide   Result Value Ref Range    BNP 71 0 - 99 pg/mL   ECG 12 lead   Result Value Ref Range    Ventricular Rate 77 BPM    Atrial Rate 77 BPM    IN Interval 166 ms    QRS Duration 102 ms    QT Interval 406 ms    QTC Calculation(Bazett) 459 ms    P Axis 40 degrees    R Axis -32 degrees    T Axis -20 degrees    QRS Count 13 beats    Q Onset 222 ms    P Onset 139 ms    P Offset 193 ms    T Offset 425 ms    QTC Fredericia 441 ms   ECG 12 lead   Result Value Ref  Range    Ventricular Rate 66 BPM    Atrial Rate 66 BPM    FL Interval 170 ms    QRS Duration 110 ms    QT Interval 444 ms    QTC Calculation(Bazett) 465 ms    P Axis 22 degrees    R Axis -34 degrees    T Axis -28 degrees    QRS Count 11 beats    Q Onset 219 ms    P Onset 134 ms    P Offset 199 ms    T Offset 441 ms    QTC Fredericia 458 ms   ECG 12 lead   Result Value Ref Range    Ventricular Rate 69 BPM    Atrial Rate 69 BPM    FL Interval 174 ms    QRS Duration 108 ms    QT Interval 466 ms    QTC Calculation(Bazett) 499 ms    P Axis 24 degrees    R Axis -33 degrees    T Axis -47 degrees    QRS Count 12 beats    Q Onset 208 ms    P Onset 121 ms    P Offset 184 ms    T Offset 441 ms    QTC Fredericia 488 ms   Troponin, High Sensitivity, 1 Hour   Result Value Ref Range    Troponin I, High Sensitivity 8 0 - 20 ng/L       Imaging:  ECG 12 lead    Result Date: 11/25/2024  Normal sinus rhythm Left axis deviation Incomplete right bundle branch block Minimal voltage criteria for LVH, may be normal variant Nonspecific ST and T wave abnormality Prolonged QT Abnormal ECG When compared with ECG of 25-NOV-2024 07:59, (unconfirmed) T wave inversion now evident in Anterior leads    CT head wo IV contrast    Result Date: 11/25/2024  Interpreted By:  Evette Araiza, STUDY: CT HEAD WO IV CONTRAST;  11/25/2024 8:35 am   INDICATION: Signs/Symptoms:vertigo.     COMPARISON: None.   ACCESSION NUMBER(S): [id number]   ORDERING CLINICIAN: SINAN REID   TECHNIQUE: Unenhanced CT images of the head were obtained.   FINDINGS: The ventricles, cisterns and sulci are prominent, consistent with diffuse volume loss. There are areas of nonspecific white matter hypodensity, which are probably age related or microvascular in nature.   There is no acute intracranial hemorrhage, mass effect or midline shift. No extraaxial fluid collection.   No focal calvarial lesion.   Small nodular density in the medial right maxillary sinus on the most caudal image.  Remaining visualized paranasal sinuses and mastoid air cells are clear.           No acute intracranial hemorrhage or mass-effect.   Partially included small possible mucous retention cyst or polyp in the right maxillary sinus.   MACRO: None.   Signed by: Evette Araiza 11/25/2024 8:42 AM Dictation workstation:   [id number]    XR chest 1 view    Result Date: 11/25/2024  Interpreted By:  Evette Araiza, STUDY: XR CHEST 1 VIEW;  11/25/2024 8:24 am   INDICATION: Signs/Symptoms:Chest Pain.     COMPARISON: None.   ACCESSION NUMBER(S): [id number]   ORDERING CLINICIAN: SINAN REID   FINDINGS: Artifact from overlying monitoring leads noted.  Apical lordotic projection obtained. Small nodular shadow in the left lung base. No focal infiltrate, pleural effusion or pneumothorax identified. Cardiac silhouette is within normal limits for size.       Small left basilar nodule or nipple shadow. PA and lateral chest x-ray with nipple markers recommended when the patient is able.   MACRO: None.   Signed by: Evette Araiza 11/25/2024 8:38 AM Dictation workstation:   [id number]    ECG 12 lead    Result Date: 11/25/2024  Sinus rhythm with occasional Premature ventricular complexes Left axis deviation Minimal voltage criteria for LVH, may be normal variant Nonspecific ST abnormality Abnormal ECG No previous ECGs available    ECG 12 lead    Result Date: 11/25/2024  Normal sinus rhythm Left axis deviation Minimal voltage criteria for LVH, may be normal variant Nonspecific ST abnormality Abnormal ECG When compared with ECG of 25-NOV-2024 07:45, (unconfirmed) Premature ventricular complexes are no longer Present      Medications:  Scheduled medications    Continuous medications    PRN medications      Assessment    Assessment & Plan   Mr. Donnie Nogueira is a 64 y.o. male who presents with dizziness and admitted for peripheral vertigo likely from BBPV      #Dizziness likely 2/2 BBPV  Dizziness that is worsened upon positional changes with  horizontal nystagmus on exam, consistent with BPPV or peripheral vertigo  -CT head negative for acute ischemic changes  -Meclizine prn for dizziness  -Zofran prn for nausea  -Vestibular PT        Diet: Regular  DVT prophylaxis: Lovenox  Telemetry: Not indicated  Consults: None      Code Status: Full Code       Dispo: Vestibular PT pending. Anticipate discharge home once patient is able to ambulate without issue.    Case seen and discussed with Dr. Lani Rodriguez DO  PGY-3 Internal Medicine  This note has been transcribed using Dragon voice recognition system and there is a possibility of unintentional typing misprints.  Any information found to be copied from previous providers is done in the best interest of the patient to provide accurate, quality, and continuity of care.

## 2024-11-25 NOTE — ED TRIAGE NOTES
63 y/o patient arrives in the ED via ambulance with complains for dizziness, blurred vision, and generalized weakness. On arrival, an NIH scale was performed by resident and nurse. NIH score was a zero. Patient denies any chest pain or SOB. Patient does experience some heartburn. BP elevated with a first reading of 192/83. Immediate EKG and labs were done. Call bell within reach of patient and educated on not getting up himself due to dizziness.

## 2024-11-25 NOTE — CARE PLAN
The patient's goals for the shift include no  dizziness    The clinical goals for the shift include no  further  dizziness

## 2024-11-26 ENCOUNTER — APPOINTMENT (OUTPATIENT)
Dept: PRIMARY CARE | Facility: CLINIC | Age: 64
End: 2024-11-26
Payer: COMMERCIAL

## 2024-11-26 PROCEDURE — 2500000001 HC RX 250 WO HCPCS SELF ADMINISTERED DRUGS (ALT 637 FOR MEDICARE OP): Performed by: INTERNAL MEDICINE

## 2024-11-26 PROCEDURE — 97161 PT EVAL LOW COMPLEX 20 MIN: CPT | Mod: GP

## 2024-11-26 PROCEDURE — G0378 HOSPITAL OBSERVATION PER HR: HCPCS

## 2024-11-26 PROCEDURE — 2500000002 HC RX 250 W HCPCS SELF ADMINISTERED DRUGS (ALT 637 FOR MEDICARE OP, ALT 636 FOR OP/ED)

## 2024-11-26 PROCEDURE — RXMED WILLOW AMBULATORY MEDICATION CHARGE

## 2024-11-26 PROCEDURE — 99232 SBSQ HOSP IP/OBS MODERATE 35: CPT

## 2024-11-26 RX ORDER — DROPERIDOL 2.5 MG/ML
2.5 INJECTION, SOLUTION INTRAMUSCULAR; INTRAVENOUS ONCE
Status: DISCONTINUED | OUTPATIENT
Start: 2024-11-26 | End: 2024-11-27 | Stop reason: HOSPADM

## 2024-11-26 RX ORDER — ONDANSETRON HYDROCHLORIDE 2 MG/ML
4 INJECTION, SOLUTION INTRAVENOUS EVERY 4 HOURS PRN
Status: DISCONTINUED | OUTPATIENT
Start: 2024-11-26 | End: 2024-11-27 | Stop reason: HOSPADM

## 2024-11-26 RX ORDER — MECLIZINE HYDROCHLORIDE 25 MG/1
25 TABLET ORAL 3 TIMES DAILY PRN
Status: DISCONTINUED | OUTPATIENT
Start: 2024-11-26 | End: 2024-11-27 | Stop reason: HOSPADM

## 2024-11-26 RX ORDER — MECLIZINE HYDROCHLORIDE 25 MG/1
25 TABLET ORAL 3 TIMES DAILY PRN
Qty: 30 TABLET | Refills: 0 | Status: SHIPPED | OUTPATIENT
Start: 2024-11-26

## 2024-11-26 SDOH — SOCIAL STABILITY: SOCIAL INSECURITY
WITHIN THE LAST YEAR, HAVE YOU BEEN RAPED OR FORCED TO HAVE ANY KIND OF SEXUAL ACTIVITY BY YOUR PARTNER OR EX-PARTNER?: NO

## 2024-11-26 SDOH — HEALTH STABILITY: PHYSICAL HEALTH
HOW OFTEN DO YOU NEED TO HAVE SOMEONE HELP YOU WHEN YOU READ INSTRUCTIONS, PAMPHLETS, OR OTHER WRITTEN MATERIAL FROM YOUR DOCTOR OR PHARMACY?: NEVER

## 2024-11-26 SDOH — SOCIAL STABILITY: SOCIAL INSECURITY: POSSIBLE ABUSE REPORTED TO:: OTHER (COMMENT)

## 2024-11-26 SDOH — ECONOMIC STABILITY: INCOME INSECURITY: IN THE PAST 12 MONTHS HAS THE ELECTRIC, GAS, OIL, OR WATER COMPANY THREATENED TO SHUT OFF SERVICES IN YOUR HOME?: NO

## 2024-11-26 SDOH — ECONOMIC STABILITY: FOOD INSECURITY: WITHIN THE PAST 12 MONTHS, THE FOOD YOU BOUGHT JUST DIDN'T LAST AND YOU DIDN'T HAVE MONEY TO GET MORE.: NEVER TRUE

## 2024-11-26 SDOH — SOCIAL STABILITY: SOCIAL INSECURITY: ARE THERE ANY APPARENT SIGNS OF INJURIES/BEHAVIORS THAT COULD BE RELATED TO ABUSE/NEGLECT?: NO

## 2024-11-26 SDOH — SOCIAL STABILITY: SOCIAL INSECURITY: WERE YOU ABLE TO COMPLETE ALL THE BEHAVIORAL HEALTH SCREENINGS?: YES

## 2024-11-26 SDOH — SOCIAL STABILITY: SOCIAL INSECURITY: DO YOU FEEL UNSAFE GOING BACK TO THE PLACE WHERE YOU ARE LIVING?: NO

## 2024-11-26 SDOH — SOCIAL STABILITY: SOCIAL INSECURITY: WITHIN THE LAST YEAR, HAVE YOU BEEN AFRAID OF YOUR PARTNER OR EX-PARTNER?: NO

## 2024-11-26 SDOH — HEALTH STABILITY: MENTAL HEALTH: EXPERIENCED ANY OF THE FOLLOWING LIFE EVENTS: OTHER (COMMENT)

## 2024-11-26 SDOH — SOCIAL STABILITY: SOCIAL INSECURITY: WITHIN THE LAST YEAR, HAVE YOU BEEN HUMILIATED OR EMOTIONALLY ABUSED IN OTHER WAYS BY YOUR PARTNER OR EX-PARTNER?: NO

## 2024-11-26 SDOH — HEALTH STABILITY: PHYSICAL HEALTH: ON AVERAGE, HOW MANY MINUTES DO YOU ENGAGE IN EXERCISE AT THIS LEVEL?: 0 MIN

## 2024-11-26 SDOH — HEALTH STABILITY: PHYSICAL HEALTH: ON AVERAGE, HOW MANY DAYS PER WEEK DO YOU ENGAGE IN MODERATE TO STRENUOUS EXERCISE (LIKE A BRISK WALK)?: 0 DAYS

## 2024-11-26 SDOH — SOCIAL STABILITY: SOCIAL NETWORK
DO YOU BELONG TO ANY CLUBS OR ORGANIZATIONS SUCH AS CHURCH GROUPS, UNIONS, FRATERNAL OR ATHLETIC GROUPS, OR SCHOOL GROUPS?: NO

## 2024-11-26 SDOH — SOCIAL STABILITY: SOCIAL INSECURITY
WITHIN THE LAST YEAR, HAVE YOU BEEN KICKED, HIT, SLAPPED, OR OTHERWISE PHYSICALLY HURT BY YOUR PARTNER OR EX-PARTNER?: NO

## 2024-11-26 SDOH — SOCIAL STABILITY: SOCIAL NETWORK: HOW OFTEN DO YOU ATTEND CHURCH OR RELIGIOUS SERVICES?: NEVER

## 2024-11-26 SDOH — HEALTH STABILITY: MENTAL HEALTH
DO YOU FEEL STRESS - TENSE, RESTLESS, NERVOUS, OR ANXIOUS, OR UNABLE TO SLEEP AT NIGHT BECAUSE YOUR MIND IS TROUBLED ALL THE TIME - THESE DAYS?: NOT AT ALL

## 2024-11-26 SDOH — SOCIAL STABILITY: SOCIAL NETWORK
IN A TYPICAL WEEK, HOW MANY TIMES DO YOU TALK ON THE PHONE WITH FAMILY, FRIENDS, OR NEIGHBORS?: MORE THAN THREE TIMES A WEEK

## 2024-11-26 SDOH — SOCIAL STABILITY: SOCIAL INSECURITY: ABUSE: ADULT

## 2024-11-26 SDOH — ECONOMIC STABILITY: FOOD INSECURITY: WITHIN THE PAST 12 MONTHS, YOU WORRIED THAT YOUR FOOD WOULD RUN OUT BEFORE YOU GOT THE MONEY TO BUY MORE.: NEVER TRUE

## 2024-11-26 SDOH — SOCIAL STABILITY: SOCIAL NETWORK: HOW OFTEN DO YOU ATTEND MEETINGS OF THE CLUBS OR ORGANIZATIONS YOU BELONG TO?: NEVER

## 2024-11-26 SDOH — SOCIAL STABILITY: SOCIAL INSECURITY: HAVE YOU HAD THOUGHTS OF HARMING ANYONE ELSE?: NO

## 2024-11-26 SDOH — SOCIAL STABILITY: SOCIAL INSECURITY: ARE YOU OR HAVE YOU BEEN THREATENED OR ABUSED PHYSICALLY, EMOTIONALLY, OR SEXUALLY BY ANYONE?: NO

## 2024-11-26 SDOH — SOCIAL STABILITY: SOCIAL INSECURITY: DOES ANYONE TRY TO KEEP YOU FROM HAVING/CONTACTING OTHER FRIENDS OR DOING THINGS OUTSIDE YOUR HOME?: NO

## 2024-11-26 SDOH — SOCIAL STABILITY: SOCIAL INSECURITY: DO YOU FEEL ANYONE HAS EXPLOITED OR TAKEN ADVANTAGE OF YOU FINANCIALLY OR OF YOUR PERSONAL PROPERTY?: NO

## 2024-11-26 SDOH — SOCIAL STABILITY: SOCIAL INSECURITY: HAS ANYONE EVER THREATENED TO HURT YOUR FAMILY OR YOUR PETS?: NO

## 2024-11-26 SDOH — SOCIAL STABILITY: SOCIAL INSECURITY: HAVE YOU HAD ANY THOUGHTS OF HARMING ANYONE ELSE?: NO

## 2024-11-26 SDOH — SOCIAL STABILITY: SOCIAL NETWORK: HOW OFTEN DO YOU GET TOGETHER WITH FRIENDS OR RELATIVES?: THREE TIMES A WEEK

## 2024-11-26 ASSESSMENT — COGNITIVE AND FUNCTIONAL STATUS - GENERAL
MOBILITY SCORE: 22
STANDING UP FROM CHAIR USING ARMS: A LITTLE
WALKING IN HOSPITAL ROOM: A LITTLE
CLIMB 3 TO 5 STEPS WITH RAILING: A LITTLE
WALKING IN HOSPITAL ROOM: A LITTLE
WALKING IN HOSPITAL ROOM: A LITTLE
CLIMB 3 TO 5 STEPS WITH RAILING: A LITTLE
MOBILITY SCORE: 21
DAILY ACTIVITIY SCORE: 24
CLIMB 3 TO 5 STEPS WITH RAILING: A LITTLE
MOBILITY SCORE: 22
DAILY ACTIVITIY SCORE: 24

## 2024-11-26 ASSESSMENT — PAIN SCALES - GENERAL
PAINLEVEL_OUTOF10: 0 - NO PAIN
PAINLEVEL_OUTOF10: 0 - NO PAIN

## 2024-11-26 ASSESSMENT — ACTIVITIES OF DAILY LIVING (ADL)
ADEQUATE_TO_COMPLETE_ADL: YES
LACK_OF_TRANSPORTATION: NO
LACK_OF_TRANSPORTATION: NO

## 2024-11-26 ASSESSMENT — LIFESTYLE VARIABLES
AUDIT TOTAL SCORE: 0
PRESCIPTION_ABUSE_PAST_12_MONTHS: NO
AUDIT TOTAL SCORE: 9
HOW MANY STANDARD DRINKS CONTAINING ALCOHOL DO YOU HAVE ON A TYPICAL DAY: 5 OR 6
HOW OFTEN DURING THE LAST YEAR HAVE YOU FOUND THAT YOU WERE NOT ABLE TO STOP DRINKING ONCE YOU HAD STARTED: NEVER
HAVE YOU OR SOMEONE ELSE BEEN INJURED AS A RESULT OF YOUR DRINKING: NO
AUDIT-C TOTAL SCORE: 9
AUDIT-C TOTAL SCORE: 9
HOW OFTEN DO YOU HAVE 6 OR MORE DRINKS ON ONE OCCASION: WEEKLY
HOW OFTEN DURING THE LAST YEAR HAVE YOU BEEN UNABLE TO REMEMBER WHAT HAPPENED THE NIGHT BEFORE BECAUSE YOU HAD BEEN DRINKING: NEVER
HOW OFTEN DO YOU HAVE A DRINK CONTAINING ALCOHOL: 4 OR MORE TIMES A WEEK
HAS A RELATIVE, FRIEND, DOCTOR, OR ANOTHER HEALTH PROFESSIONAL EXPRESSED CONCERN ABOUT YOUR DRINKING OR SUGGESTED YOU CUT DOWN: NO
SUBSTANCE_ABUSE_PAST_12_MONTHS: NO
HOW OFTEN DURING THE LAST YEAR HAVE YOU HAD A FEELING OF GUILT OR REMORSE AFTER DRINKING: NEVER
HOW OFTEN DURING THE LAST YEAR HAVE YOU FAILED TO DO WHAT WAS NORMALLY EXPECTED FROM YOU BECAUSE OF DRINKING: NEVER
HOW OFTEN DURING THE LAST YEAR HAVE YOU NEEDED AN ALCOHOLIC DRINK FIRST THING IN THE MORNING TO GET YOURSELF GOING AFTER A NIGHT OF HEAVY DRINKING: NEVER
SKIP TO QUESTIONS 9-10: 0

## 2024-11-26 ASSESSMENT — PATIENT HEALTH QUESTIONNAIRE - PHQ9
SUM OF ALL RESPONSES TO PHQ9 QUESTIONS 1 & 2: 0
2. FEELING DOWN, DEPRESSED OR HOPELESS: NOT AT ALL
1. LITTLE INTEREST OR PLEASURE IN DOING THINGS: NOT AT ALL

## 2024-11-26 ASSESSMENT — PAIN - FUNCTIONAL ASSESSMENT: PAIN_FUNCTIONAL_ASSESSMENT: 0-10

## 2024-11-26 NOTE — DISCHARGE INSTRUCTIONS
Please call your primary care physician's office and make an appointment for follow-up from this visit for sometime in the next 2-3 days.    Please make an appointment with Vestibular Physical Therapy for your vertigo    Please take all your medications as directed.     NEW MEDICATIONS:  Meclizine 25mg one tablet three times daily as needed (PLEASE DO NOT TAKE PRIOR TO WORKING WITH PHYSICAL THERAPY)    If you experience any further concerning symptoms, or return of symptoms, please make sure to seek immediate medical attention.    Thank you for trusting our team and allowing us to participate in your healthcare.  We hope you feel better soon.    [facility] Teaching Service.

## 2024-11-26 NOTE — PROGRESS NOTES
11/26/24 2324   Discharge Planning   Living Arrangements Spouse/significant other   Support Systems Spouse/significant other   Assistance Needed none   Type of Residence Private residence   Do you have animals or pets at home? No   Home or Post Acute Services None   Expected Discharge Disposition Home   Financial Resource Strain   How hard is it for you to pay for the very basics like food, housing, medical care, and heating? Not hard   Housing Stability   In the last 12 months, was there a time when you were not able to pay the mortgage or rent on time? N   At any time in the past 12 months, were you homeless or living in a shelter (including now)? N   Transportation Needs   In the past 12 months, has lack of transportation kept you from medical appointments or from getting medications? no   In the past 12 months, has lack of transportation kept you from meetings, work, or from getting things needed for daily living? No     Met with patient at the bedside, explained my role in his discharge process, verified demographics and insurance. He does not have a pcp and a provider list was provided.  He is independent in his care needs and denies any financial needs. He will discharge home with no further needs.

## 2024-11-26 NOTE — PROGRESS NOTES
"Internal Medicine    Progress Note     Patient Donnie Nogueira PCP No primary care provider on file.    MRN [id number]  Admission Date 11/25/2024      Donnie Nogueira is a 64 y.o. male on hospital day 0 of admission presenting with Dizziness.    Subjective   Patient seen and examined at bedside. No acute events overnight. He does endorse some dizziness and nausea this morning. He mentions he would like to stay another night because he does not feel well. He refused therapy this AM due to not feeling well.       Objective   Vital Signs:  Visit Vitals  /68 (BP Location: Left arm, Patient Position: Lying)   Pulse 65   Temp 36.3 °C (97.3 °F) (Temporal)   Resp 18   Ht 1.753 m (5' 9\")   Wt 90.9 kg (200 lb 6.4 oz)   SpO2 96%   BMI 29.59 kg/m²   Smoking Status Former   BSA 2.1 m²        Physical Examination:  General: Patient does not appear to be in any acute distress, alert, awake  Head: Normocephalic, Atraumatic   Eyes: Normal external exam, EOMI  ENT: Normal external inspection of ears and nose. Oropharynx normal.  Cardiovascular: RRR, S1/S2, no murmurs, rubs, or gallops, radial pulses +2  Pulmonary: CTAB, no respiratory distress.  Abdomen: +BS, soft, non-tender, nondistended, no guarding or rebound, no masses noted  Neuro: A&O x3, CN intact, no focal deficits, strength and sensation intact bilaterally, positive right sided horizontal nystagmus, H2S and F2N intact. NIH 0  MSK: No joint swelling, normal movements of all extremities. Passive ROM intact  Extremities: No edema appreciated in lower extremities bilaterally, no cyanosis  Skin- Warm. Dry. No lesions, contusions, or erythema.  Psychiatric: Judgment intact. Appropriate mood and behavior      Laboratory Data:  No results found for this or any previous visit (from the past 24 hours).    Imaging:  ECG 12 lead    Result Date: 11/25/2024  Normal sinus rhythm Left axis deviation Minimal voltage criteria for LVH, may be normal variant Nonspecific ST abnormality Abnormal ECG " When compared with ECG of 25-NOV-2024 07:45, (unconfirmed) Premature ventricular complexes are no longer Present    ECG 12 lead    Result Date: 11/25/2024  Sinus rhythm with occasional Premature ventricular complexes Left axis deviation Minimal voltage criteria for LVH, may be normal variant Nonspecific ST abnormality Abnormal ECG No previous ECGs available    ECG 12 lead    Result Date: 11/25/2024  Normal sinus rhythm Incomplete right bundle branch block Borderline ECG When compared with ECG of 25-NOV-2024 09:04, (unconfirmed) T wave inversion no longer evident in Anterior leads    ECG 12 lead    Result Date: 11/25/2024  Normal sinus rhythm Left axis deviation Incomplete right bundle branch block Minimal voltage criteria for LVH, may be normal variant Nonspecific ST and T wave abnormality Prolonged QT Abnormal ECG When compared with ECG of 25-NOV-2024 07:59, (unconfirmed) T wave inversion now evident in Anterior leads    CT head wo IV contrast    Result Date: 11/25/2024  Interpreted By:  Evette Araiza, STUDY: CT HEAD WO IV CONTRAST;  11/25/2024 8:35 am   INDICATION: Signs/Symptoms:vertigo.     COMPARISON: None.   ACCESSION NUMBER(S): [id number]   ORDERING CLINICIAN: SINAN REID   TECHNIQUE: Unenhanced CT images of the head were obtained.   FINDINGS: The ventricles, cisterns and sulci are prominent, consistent with diffuse volume loss. There are areas of nonspecific white matter hypodensity, which are probably age related or microvascular in nature.   There is no acute intracranial hemorrhage, mass effect or midline shift. No extraaxial fluid collection.   No focal calvarial lesion.   Small nodular density in the medial right maxillary sinus on the most caudal image. Remaining visualized paranasal sinuses and mastoid air cells are clear.           No acute intracranial hemorrhage or mass-effect.   Partially included small possible mucous retention cyst or polyp in the right maxillary sinus.   MACRO: None.    Signed by: vEette Araiza 11/25/2024 8:42 AM Dictation workstation:   [id number]    XR chest 1 view    Result Date: 11/25/2024  Interpreted By:  Evette Araiza, STUDY: XR CHEST 1 VIEW;  11/25/2024 8:24 am   INDICATION: Signs/Symptoms:Chest Pain.     COMPARISON: None.   ACCESSION NUMBER(S): [id number]   ORDERING CLINICIAN: SINAN REID   FINDINGS: Artifact from overlying monitoring leads noted.  Apical lordotic projection obtained. Small nodular shadow in the left lung base. No focal infiltrate, pleural effusion or pneumothorax identified. Cardiac silhouette is within normal limits for size.       Small left basilar nodule or nipple shadow. PA and lateral chest x-ray with nipple markers recommended when the patient is able.   MACRO: None.   Signed by: Evette Araiza 11/25/2024 8:38 AM Dictation workstation:   [id number]      Medications:  Scheduled medications  droperidol, 2.5 mg, intramuscular, Once  enoxaparin, 40 mg, subcutaneous, q24h  folic acid, 1 mg, oral, Daily  multivitamin with minerals, 1 tablet, oral, Daily  thiamine, 100 mg, oral, Daily      Continuous medications     PRN medications  PRN medications: meclizine, ondansetron      Assessment    Assessment & Plan   Mr. Donnie Nogueira is a 64 y.o. male who presents with dizziness and admitted for peripheral vertigo likely from BBPV        #Dizziness likely 2/2 BBPV  Dizziness that is worsened upon positional changes with horizontal nystagmus on exam, consistent with BPPV or peripheral vertigo  -CT head negative for acute ischemic changes  -Meclizine prn for dizziness  -Zofran prn for nausea  -Will try Droperidol as an adjuvant for vertiginous symptoms  -Vestibular PT           Diet: Regular  DVT prophylaxis: Lovenox  Telemetry: Not indicated  Consults: None        Code Status: Full Code         Dispo: PT recommending outpatient vestibular thearpy. Anticipate discharge home once patient is able to ambulate without issue, likely tomorrow.    Case seen and  discussed with attending  Meliton Rodriguez DO  PGY-3 Internal Medicine  This note has been transcribed using Dragon voice recognition system and there is a possibility of unintentional typing misprints.  Any information found to be copied from previous providers is done in the best interest of the patient to provide accurate, quality, and continuity of care.

## 2024-11-26 NOTE — HOSPITAL COURSE
Mr. Donnie Nogueira is a 64 y.o. male with limited past medical history who presents to the ED with dizziness and blurred vision.  He states symptoms started Saturday night, endorses room spinning sensation and admitted for vertigo secondary to BPPV.  In the ED, he was hemodynamically stable.  Initial labs showed mild hyponatremia 132 with NAGMA bicarb 20.  Given IVF, and meclizine for dizziness, and Zofran for dizziness.  He endorsed improvement of symptoms following meclizine administration.  He worked with PT, who recommended outpatient vestibular PT.  He was prescribed meclizine on discharge, and discharged home in stable condition.

## 2024-11-26 NOTE — PROGRESS NOTES
Spiritual Care Visit    Clinical Encounter Type  Visited With: Patient  Routine Visit: Introduction  Continue Visiting: Yes     Enjoyed brief visit with patient - discussed his dizziness and how he hopes to get back home to take care of his wife.    Taxonomy  Intended Effects: Aligning care plan with patient's values, Build relationship of care and support, Convey a calming presence, Demonstrate caring and concern, Lessen someone's feelings of isolation, Lessen anxiety, Helping someone feel comforted, Establish rapport and connectedness, Meaning-making, Preserve dignity and respect, Promote sense of peace

## 2024-11-26 NOTE — PROGRESS NOTES
Physical Therapy    Physical Therapy Evaluation    Patient Name: Donnie Nogueira  MRN: [id number]  Today's Date: 11/26/2024   Time Calculation  Start Time: 0956  Stop Time: 1008  Time Calculation (min): 12 min  3111/3111-A    Assessment/Plan   PT Assessment: Pt demonstrates mildly impaired balance and mild dizziness.  Pt states he notices increased dizziness with head movement and positional changes.  Symptoms appears to correlates with BPPV which improved with administration of Meclizine in the ED.  Pt requesting more Meclizine to help with dizziness, RN and MD made aware.  Offered pt Lillian Hallpike with potential for Epley maneuver, but pt declined 2/2 not wanting to flare symptoms up more.  Left pt with contact information for outpatient vestibular therapy if symptoms persist.  It is anticipated that upon DC, pt will be safe to return home and follow up with outpatient vestibular PT.    PT Assessment Results: Decreased strength, Impaired balance, Decreased mobility  Rehab Prognosis: Good  Evaluation/Treatment Tolerance: Patient tolerated treatment well  Medical Staff Made Aware: Yes  End of Session Communication: Bedside nurse  End of Session Patient Position: Bed, 3 rail up, Alarm on  IP OR SWING BED PT PLAN  Inpatient or Swing Bed: Inpatient  PT Plan  Treatment/Interventions: Bed mobility, Transfer training, Gait training, Stair training, Balance training, Neuromuscular re-education, Strengthening, Endurance training, Range of motion, Therapeutic exercise, Therapeutic activity, Home exercise program  PT Plan: Ongoing PT  PT Frequency: One time follow up visit  PT Discharge Recommendations: Low intensity level of continued care (outpatient vestibular PT)  PT Recommended Transfer Status: Stand by assist  PT - OK to Discharge: Yes - To next level of care when cleared by medical team    Subjective     Current Problem:  1. Lightheadedness        2. Blood pressure elevated without history of HTN  Referral to Primary Care     "  3. Alcohol use        4. Hyponatremia        5. Dizziness  meclizine (Antivert) 25 mg tablet    Referral to Primary Care - Family Practice    Referral to Physical Therapy          Past Medical History:  Patient Active Problem List   Diagnosis    Dizziness       General Visit Information:  Per EMR: pt presents to the ED with dizziness and blurred vision.  He states symptoms started Saturday night, endorses room spinning sensation.  He states the symptoms are worse upon moving, and improve when sitting down.  He denies any recent illness, but does endorse left ear fullness.  He states he had similar symptoms a few years ago that spontaneously resolved.  He states he drinks 6 standard light beers daily, but denies any history of alcohol withdrawal or admissions to the hospital for withdrawal-like symptoms or seizures or intubation secondary to alcohol use.  He denies any chest pain, shortness of breath, nausea, vomiting, abdominal pain, urinary symptoms, or leg swelling.  He states his dizziness improved on receiving meclizine in the ED.     On arrival, pt supine in bed.  Pt in no apparent distress and agreeable to therapy.    General  Reason for Referral: vestibular PT  Referred By: Cody Garibay  Prior to Session Communication: Bedside nurse  Patient Position Received: Bed, 3 rail up, Alarm on    Home Living/PLOF:  Pt lives with wife in house with 3 TAYLOR wit rail.  1 floor set up.  Has tub shower with chair and Gbs.  IND with mobility and ADLs.  Does not use device, does not own any assistive devices.  Pt denies any falls but states he hit his head on samuel stand \"a few weeks ago.\"  States he does not recognize any correlation of symptoms from that.  Pt drives.    Precautions:  Precautions  Medical Precautions: Fall precautions     Objective     Pain:  Pain Assessment  Pain Assessment: 0-10  0-10 (Numeric) Pain Score:  (pt denies any pain but reports mild-moderate heart burn)    Cognition:  Cognition  Overall " Cognitive Status: Within Functional Limits  Orientation Level: Oriented X4    General Assessments:      Activity Tolerance  Endurance: Endurance does not limit participation in activity  Sensation  Sensation Comment: denies any numbness/tingling    Static Sitting Balance  Static Sitting-Comment/Number of Minutes: good  Dynamic Sitting Balance  Dynamic Sitting-Comments: good  Static Standing Balance  Static Standing-Comment/Number of Minutes: good  Dynamic Standing Balance  Dynamic Standing-Comments: fair plus    Extremity/Trunk Assessments:  BLE strength: WFL    Functional Mobility:  Bed mobility  Supine to sit: SUP  Sit to supine: SUP    Transfers  Sit to stand: SBA; (+) mild dizziness  Stand to sit: SBA    Ambulation/Stairs  Pt ambulated 20 ft with SBA without device; grossly steady without LOB; pt reports mild dizziness during session    Outcome Measures:  [facility] Basic Mobility  Turning from your back to your side while in a flat bed without using bedrails: None  Moving from lying on your back to sitting on the side of a flat bed without using bedrails: None  Moving to and from bed to chair (including a wheelchair): None  Standing up from a chair using your arms (e.g. wheelchair or bedside chair): A little  To walk in hospital room: A little  Climbing 3-5 steps with railing: A little  Basic Mobility - Total Score: 21    Goals:  Encounter Problems       Encounter Problems (Active)       PT Problem       Pt will be able to perform all bed mobility tasks with Mod I.  (Progressing)       Start:  11/26/24    Expected End:  12/10/24            Pt will perform all transfers with IND with proper safety mechanics.   (Progressing)       Start:  11/26/24    Expected End:  12/10/24            Pt will ambulate 200 ft with IND for improved functional independence.  (Progressing)       Start:  11/26/24    Expected End:  12/10/24            Pt will be able to negotiate 3 steps with 1 HR with Mod I.  (Progressing)       Start:   11/26/24    Expected End:  12/10/24                 Education Documentation  Body Mechanics, taught by Shantell Lake, PT at 11/26/2024  1:38 PM.  Learner: Patient  Readiness: Acceptance  Method: Explanation  Response: Verbalizes Understanding    Home Exercise Program, taught by Shantell Lake, PT at 11/26/2024  1:38 PM.  Learner: Patient  Readiness: Acceptance  Method: Explanation  Response: Verbalizes Understanding    Mobility Training, taught by Shantell Lake, PT at 11/26/2024  1:38 PM.  Learner: Patient  Readiness: Acceptance  Method: Explanation  Response: Verbalizes Understanding    Education Comments  No comments found.

## 2024-11-26 NOTE — CARE PLAN
The patient's goals for the shift include   Problem: Pain - Adult  Goal: Verbalizes/displays adequate comfort level or baseline comfort level  Outcome: Progressing     Problem: Safety - Adult  Goal: Free from fall injury  Outcome: Progressing     Problem: Discharge Planning  Goal: Discharge to home or other facility with appropriate resources  Outcome: Progressing     Problem: Chronic Conditions and Co-morbidities  Goal: Patient's chronic conditions and co-morbidity symptoms are monitored and maintained or improved  Outcome: Progressing     Problem: Fall/Injury  Goal: Not fall by end of shift  Outcome: Progressing  Goal: Be free from injury by end of the shift  Outcome: Progressing  Goal: Verbalize understanding of personal risk factors for fall in the hospital  Outcome: Progressing  Goal: Verbalize understanding of risk factor reduction measures to prevent injury from fall in the home  Outcome: Progressing  Goal: Use assistive devices by end of the shift  Outcome: Progressing  Goal: Pace activities to prevent fatigue by end of the shift  Outcome: Progressing     Problem: Skin  Goal: Promote/optimize nutrition  Outcome: Progressing  Goal: Promote skin healing  Outcome: Progressing       The clinical goals for the shift include pt will report improved vertigo by the end of this shift

## 2024-11-27 ENCOUNTER — PHARMACY VISIT (OUTPATIENT)
Dept: PHARMACY | Facility: CLINIC | Age: 64
End: 2024-11-27
Payer: MEDICARE

## 2024-11-27 VITALS
WEIGHT: 200.4 LBS | RESPIRATION RATE: 16 BRPM | HEIGHT: 69 IN | DIASTOLIC BLOOD PRESSURE: 78 MMHG | OXYGEN SATURATION: 96 % | BODY MASS INDEX: 29.68 KG/M2 | TEMPERATURE: 98.8 F | SYSTOLIC BLOOD PRESSURE: 134 MMHG | HEART RATE: 69 BPM

## 2024-11-27 PROCEDURE — 2500000002 HC RX 250 W HCPCS SELF ADMINISTERED DRUGS (ALT 637 FOR MEDICARE OP, ALT 636 FOR OP/ED)

## 2024-11-27 PROCEDURE — G0378 HOSPITAL OBSERVATION PER HR: HCPCS

## 2024-11-27 PROCEDURE — 2500000001 HC RX 250 WO HCPCS SELF ADMINISTERED DRUGS (ALT 637 FOR MEDICARE OP): Performed by: INTERNAL MEDICINE

## 2024-11-27 PROCEDURE — 97112 NEUROMUSCULAR REEDUCATION: CPT | Mod: GP

## 2024-11-27 PROCEDURE — 99238 HOSP IP/OBS DSCHRG MGMT 30/<: CPT

## 2024-11-27 ASSESSMENT — COGNITIVE AND FUNCTIONAL STATUS - GENERAL
MOBILITY SCORE: 24
MOBILITY SCORE: 24
DAILY ACTIVITIY SCORE: 24

## 2024-11-27 ASSESSMENT — PAIN - FUNCTIONAL ASSESSMENT
PAIN_FUNCTIONAL_ASSESSMENT: 0-10
PAIN_FUNCTIONAL_ASSESSMENT: 0-10

## 2024-11-27 ASSESSMENT — PAIN SCALES - GENERAL
PAINLEVEL_OUTOF10: 0 - NO PAIN
PAINLEVEL_OUTOF10: 0 - NO PAIN

## 2024-11-27 NOTE — NURSING NOTE
1100 Discharge instructions given to pt and reviewed, all questions answered. Awaiting family to transport home

## 2024-11-27 NOTE — CARE PLAN
The patient's goals for the shift include no  dizziness    The clinical goals for the shift include pt will report improved vertigo by the end of this shift      Problem: Safety - Adult  Goal: Free from fall injury  Outcome: Progressing     Problem: Fall/Injury  Goal: Verbalize understanding of risk factor reduction measures to prevent injury from fall in the home  Outcome: Progressing

## 2024-11-27 NOTE — DISCHARGE SUMMARY
Discharge Diagnosis  Dizziness    Issues Requiring Follow-Up  BBPV    Discharge Meds     Medication List      START taking these medications     meclizine 25 mg tablet; Commonly known as: Antivert; Take 1 tablet (25   mg) by mouth 3 times a day as needed for dizziness.     CONTINUE taking these medications     psyllium 0.4 gram capsule; Commonly known as: Metamucil       Test Results Pending At Discharge  Pending Labs       No current pending labs.            Hospital Course  Mr. Donnie Nogueira is a 64 y.o. male with limited past medical history who presents to the ED with dizziness and blurred vision.  He states symptoms started Saturday night, endorses room spinning sensation and admitted for vertigo secondary to BPPV.  In the ED, he was hemodynamically stable.  Initial labs showed mild hyponatremia 132 with NAGMA bicarb 20.  Given IVF, and meclizine for dizziness, and Zofran for dizziness.  He endorsed improvement of symptoms following meclizine administration.  He worked with PT, who recommended outpatient vestibular PT.  He was prescribed meclizine on discharge, and discharged home in stable condition.    Pertinent Physical Exam At Time of Discharge  Physical Exam  General: Patient does not appear to be in any acute distress, alert, awake  Head: Normocephalic, Atraumatic   Eyes: Normal external exam, EOMI  ENT: Normal external inspection of ears and nose. Oropharynx normal.  Cardiovascular: RRR, S1/S2, no murmurs, rubs, or gallops, radial pulses +2  Pulmonary: CTAB, no respiratory distress.  Abdomen: +BS, soft, non-tender, nondistended, no guarding or rebound, no masses noted  Neuro: A&O x3, CN intact, no focal deficits, strength and sensation intact bilaterally, H2S and F2N intact. NIH 0  MSK: No joint swelling, normal movements of all extremities. Passive ROM intact  Extremities: No edema appreciated in lower extremities bilaterally, no cyanosis  Skin- Warm. Dry. No lesions, contusions, or  erythema.  Psychiatric: Judgment intact. Appropriate mood and behavior  Outpatient Follow-Up  No future appointments.      Meliton Rodriguez,

## 2024-11-27 NOTE — PROGRESS NOTES
Physical Therapy    Physical Therapy Treatment    Patient Name: Donnie Nogueira  MRN: [id number]  Today's Date: 11/27/2024  Time Calculation  Start Time: 0851  Stop Time: 0906  Time Calculation (min): 15 min     3111/3111-A    Assessment/Plan   PT Assessment  PT Assessment Results: Decreased mobility, Impaired balance  Rehab Prognosis: Good  Evaluation/Treatment Tolerance: Patient tolerated treatment well  Medical Staff Made Aware: Yes  End of Session Communication: Bedside nurse  Assessment Comment: Pt reports improvement in symptoms compared to yesterday.  Pt feels more confident in mobility and balance compared to yesterday.  Pt received multiple doses of Meclizine while at the hospital which I believe is contributing to his improvement of symptoms and subsequently skewing results of vestibular exam.  End of Session Patient Position: Up in chair, Alarm off, not on at start of session (pt refusing alarms)     PT Plan  Treatment/Interventions: Bed mobility, Transfer training, Gait training, Stair training, Balance training, Neuromuscular re-education, Strengthening, Endurance training, Range of motion, Therapeutic exercise, Therapeutic activity, Home exercise program  PT Plan: Ongoing PT  PT Frequency: One time follow up visit  PT Discharge Recommendations: Low intensity level of continued care (outpatient vestibular PT)  PT Recommended Transfer Status: Stand by assist  PT - OK to Discharge: Yes       11/27/24 0852   PT  Visit   PT Received On 11/27/24   Response to Previous Treatment   (Pt reports improvement in symptoms from previous day.  Pt has received a few doses of Meclizine while in the hospital.)   General   Reason for Referral vestibular PT   Referred By Cody Garibay   Prior to Session Communication Bedside nurse   Patient Position Received Bed, 3 rail up;Alarm off, not on at start of session  (pt refusing alarms)   Precautions   Medical Precautions No known precautions/limitation   Pain Assessment   Pain  Assessment 0-10   0-10 (Numeric) Pain Score 0 - No pain   Cognition   Overall Cognitive Status WFL   Orientation Level Oriented X4   Balance/Neuromuscular Re-Education   Balance/Neuromuscular Re-Education Activity Performed Yes   Balance/Neuromuscular Re-Education Activity 1   (R & L Mojgan Hallpike performed negative for nystagmus bilaterally; pt reports mild dizziness bilaterally and denies a difference between sides; mild dizziness resolved in > 30 seconds)   Balance/Neuromuscular Re-Education Activity 2 L sided Epley manuever (tx R ear): no nystagmus noted throughout manuever; mild increased in dizziness through manuever but resolved in >30 seconds  (pt received Meclizine this AM which could be skewing the results of vestibular exam)   Activity Tolerance   Endurance Endurance does not limit participation in activity   PT Assessment   PT Assessment Results Decreased mobility;Impaired balance   Rehab Prognosis Good   Evaluation/Treatment Tolerance Patient tolerated treatment well   Medical Staff Made Aware Yes   End of Session Communication Bedside nurse   Assessment Comment Pt reports improvement in symptoms compared to yesterday.  Pt feels more confident in mobility and balance compared to yesterday.  Pt received multiple doses of Meclizine while at the hospital which I believe is contributing to his improvement of symptoms and subsequently skewing results of vestibular exam.   End of Session Patient Position Up in chair;Alarm off, not on at start of session  (pt refusing alarms)       Outcome Measures:  [facility] Basic Mobility  Turning from your back to your side while in a flat bed without using bedrails: None  Moving from lying on your back to sitting on the side of a flat bed without using bedrails: None  Moving to and from bed to chair (including a wheelchair): None  Standing up from a chair using your arms (e.g. wheelchair or bedside chair): None  To walk in hospital room: None  Climbing 3-5 steps with railing:  None  Basic Mobility - Total Score: 24    EDUCATION:     Education Documentation  Body Mechanics, taught by Shantell Lake PT at 11/27/2024  9:39 AM.  Learner: Patient  Readiness: Acceptance  Method: Explanation  Response: Verbalizes Understanding    Home Exercise Program, taught by Shantell Lake PT at 11/27/2024  9:39 AM.  Learner: Patient  Readiness: Acceptance  Method: Explanation  Response: Verbalizes Understanding    Mobility Training, taught by Shantell Lake PT at 11/27/2024  9:39 AM.  Learner: Patient  Readiness: Acceptance  Method: Explanation  Response: Verbalizes Understanding    Education Comments  Pt educated on importance of follow up with OP vestibular PT.        GOALS:  Encounter Problems       Encounter Problems (Active)       PT Problem       Pt will be able to perform all bed mobility tasks with Mod I.  (Progressing)       Start:  11/26/24    Expected End:  12/10/24            Pt will perform all transfers with IND with proper safety mechanics.   (Progressing)       Start:  11/26/24    Expected End:  12/10/24            Pt will ambulate 200 ft with IND for improved functional independence.  (Progressing)       Start:  11/26/24    Expected End:  12/10/24            Pt will be able to negotiate 3 steps with 1 HR with Mod I.  (Progressing)       Start:  11/26/24    Expected End:  12/10/24

## 2024-12-05 ENCOUNTER — APPOINTMENT (OUTPATIENT)
Dept: PHYSICAL THERAPY | Facility: CLINIC | Age: 64
End: 2024-12-05
Payer: COMMERCIAL

## 2024-12-16 ENCOUNTER — APPOINTMENT (OUTPATIENT)
Dept: PRIMARY CARE | Facility: CLINIC | Age: 64
End: 2024-12-16
Payer: COMMERCIAL

## 2024-12-16 VITALS
HEART RATE: 69 BPM | OXYGEN SATURATION: 95 % | BODY MASS INDEX: 29.33 KG/M2 | SYSTOLIC BLOOD PRESSURE: 140 MMHG | DIASTOLIC BLOOD PRESSURE: 78 MMHG | TEMPERATURE: 97.7 F | HEIGHT: 69 IN | WEIGHT: 198 LBS

## 2024-12-16 DIAGNOSIS — R42 DIZZINESS: Primary | ICD-10-CM

## 2024-12-16 DIAGNOSIS — Z12.5 PROSTATE CANCER SCREENING: ICD-10-CM

## 2024-12-16 DIAGNOSIS — Z13.220 LIPID SCREENING: ICD-10-CM

## 2024-12-16 PROCEDURE — 99203 OFFICE O/P NEW LOW 30 MIN: CPT | Performed by: FAMILY MEDICINE

## 2024-12-16 PROCEDURE — 1159F MED LIST DOCD IN RCRD: CPT | Performed by: FAMILY MEDICINE

## 2024-12-16 PROCEDURE — 3008F BODY MASS INDEX DOCD: CPT | Performed by: FAMILY MEDICINE

## 2024-12-16 PROCEDURE — 1036F TOBACCO NON-USER: CPT | Performed by: FAMILY MEDICINE

## 2024-12-16 NOTE — PROGRESS NOTES
Subjective   Patient ID: Donnie Nogueira is a 64 y.o. male who presents for Establish Care.  HPI    Patient presents in the office today to establish care.   Patient heard about us through  hospital at [facility].  Patients last eye exam was years ago.  Patients last dental appointment was years ago.    Hospital follow up. IAM complete. Admitted to [facility]. Admission dates 11/25/24-11/27/24. Admitted for Lightheadedness. Days since discharge 3 weeks. Patient had dizziness. Patient advised to follow up with PT, and PCP. Patient was prescribed Antivert.    Review of Systems  Constitutional:  no chills, no fever and no night sweats.  Eyes: no blurred vision and no eyesight problems.  ENT: no hearing loss, no nasal congestion, no hoarseness and no sore throat.  Neck: no mass (es) and no swelling.  Cardiovascular: no chest pain, no intermittent leg claudication, no lower extremity edema, no palpitation and no syncope.  Respiratory: no cough, no shortness of breath during exertion, no shortness of breath at rest and no wheezing.  Gastrointestinal: no abdominal pain, no blood in stools, no constipation, no diarrhea, no melena, no nausea, no rectal pain and no vomiting.  Genitourinary: no dysuria, no change in urinary frequency, no urinary hesitancy and no feelings of urinary urgency.  Musculoskeletal: no arthralgias, no back pain and no myalgias.  Integumentary: no new skin lesions and no rashes.  Neurological: no difficulty walking, no headache, no limb weakness, no numbness and no tingling.  Psychiatric/Behavioral: no anxiety, no depression, no anhedonia and no substance use disorders.  Endocrine: no recent weight gain and no recent weight loss.  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  He did not hit he has not seen a physician for a number years prior to this admission.  He needs to get a few more blood tests and a urinalysis to be up-to-date.  Quit smoking 20 years ago and never had any major surgeries.  His father  after a quadruple bypass and his mom had some kind of heart issue to he is unsure of the cause of death for his grandparents.  His brother has diabetes he has another brother and a sister that are healthy as far as she is aware.  No alcohol and no recreational drug use.  Well-developed well-nourished mildly obese male in no acute distress physical exam today's office visit constitutional alert and oriented x3.    Head is atraumatic HEENT is within normal limits.    Neck supple no masses full range of motion.    Thyroid is normal in size no thyromegaly there is no carotid bruits.    Pulmonary exam shows clear to auscultation no respiratory distress.    Cardiovascular shows no murmur rub or gallop.  Regular rate and rhythm.    Abdominal exam soft nontender no hepatosplenomegaly or masses normal bowel sounds no rebound no guarding.    Musculoskeletal exam no joint pain no muscle pain full range of motion.    Psych exam normal mood and affect.    Dermatologic exam no skin lesions no rash no blemishes.    Neuro exam is no focal deficits.  Normal exam.    Extremities no edema normal pulses normal capillary refill.  No evidence of  "persistent neurologic findings or vertigo.  Will get his PSA cholesterol and urinalysis done follow-up when we have the results also with the family history would like at some point to get a stress test on him we will see where his cholesterol is at he is willing to do that but wants to wait till next year.  Will follow-up with him when we have the test results of doing well routine recheck 6 months  /78   Pulse 69   Temp 36.5 °C (97.7 °F) (Temporal)   Ht 1.753 m (5' 9\")   Wt 89.8 kg (198 lb)   SpO2 95%   BMI 29.24 kg/m²     Lab Results   Component Value Date    WBC 6.5 11/25/2024    HGB 14.7 11/25/2024    HCT 41.2 11/25/2024    MCV 88 11/25/2024     11/25/2024       Assessment/Plan   Problem List Items Addressed This Visit          Symptoms and Signs    Dizziness - Primary     Other Visit Diagnoses       Lipid screening        Relevant Orders    Lipid panel    Prostate cancer screening        Relevant Orders    Prostate Spec.Ag,Screen    Microscopic Only, Urine          "

## 2024-12-23 LAB
ATRIAL RATE: 66 BPM
ATRIAL RATE: 68 BPM
ATRIAL RATE: 69 BPM
ATRIAL RATE: 77 BPM
P AXIS: 22 DEGREES
P AXIS: 24 DEGREES
P AXIS: 40 DEGREES
P AXIS: 49 DEGREES
P OFFSET: 184 MS
P OFFSET: 193 MS
P OFFSET: 198 MS
P OFFSET: 199 MS
P ONSET: 121 MS
P ONSET: 130 MS
P ONSET: 134 MS
P ONSET: 139 MS
PR INTERVAL: 166 MS
PR INTERVAL: 170 MS
PR INTERVAL: 174 MS
PR INTERVAL: 178 MS
Q ONSET: 208 MS
Q ONSET: 219 MS
Q ONSET: 219 MS
Q ONSET: 222 MS
QRS COUNT: 11 BEATS
QRS COUNT: 11 BEATS
QRS COUNT: 12 BEATS
QRS COUNT: 13 BEATS
QRS DURATION: 102 MS
QRS DURATION: 108 MS
QRS DURATION: 110 MS
QRS DURATION: 112 MS
QT INTERVAL: 406 MS
QT INTERVAL: 430 MS
QT INTERVAL: 444 MS
QT INTERVAL: 466 MS
QTC CALCULATION(BAZETT): 457 MS
QTC CALCULATION(BAZETT): 459 MS
QTC CALCULATION(BAZETT): 465 MS
QTC CALCULATION(BAZETT): 499 MS
QTC FREDERICIA: 441 MS
QTC FREDERICIA: 448 MS
QTC FREDERICIA: 458 MS
QTC FREDERICIA: 488 MS
R AXIS: -29 DEGREES
R AXIS: -32 DEGREES
R AXIS: -33 DEGREES
R AXIS: -34 DEGREES
T AXIS: -20 DEGREES
T AXIS: -28 DEGREES
T AXIS: -28 DEGREES
T AXIS: -47 DEGREES
T OFFSET: 425 MS
T OFFSET: 434 MS
T OFFSET: 441 MS
T OFFSET: 441 MS
VENTRICULAR RATE: 66 BPM
VENTRICULAR RATE: 68 BPM
VENTRICULAR RATE: 69 BPM
VENTRICULAR RATE: 77 BPM

## 2025-04-18 ENCOUNTER — APPOINTMENT (OUTPATIENT)
Dept: CARDIOLOGY | Facility: HOSPITAL | Age: 65
DRG: 062 | End: 2025-04-18
Payer: MEDICARE

## 2025-04-18 ENCOUNTER — HOSPITAL ENCOUNTER (INPATIENT)
Facility: HOSPITAL | Age: 65
LOS: 2 days | Discharge: HOME | DRG: 062 | End: 2025-04-20
Attending: STUDENT IN AN ORGANIZED HEALTH CARE EDUCATION/TRAINING PROGRAM | Admitting: INTERNAL MEDICINE
Payer: MEDICARE

## 2025-04-18 ENCOUNTER — APPOINTMENT (OUTPATIENT)
Dept: RADIOLOGY | Facility: HOSPITAL | Age: 65
DRG: 062 | End: 2025-04-18
Payer: MEDICARE

## 2025-04-18 DIAGNOSIS — I63.9 CEREBROVASCULAR ACCIDENT (CVA), UNSPECIFIED MECHANISM (MULTI): Primary | ICD-10-CM

## 2025-04-18 DIAGNOSIS — I66.8 OCCLUSION AND STENOSIS OF OTHER CEREBRAL ARTERIES: ICD-10-CM

## 2025-04-18 DIAGNOSIS — R42 DIZZINESS: ICD-10-CM

## 2025-04-18 DIAGNOSIS — I67.2 CEREBRAL ARTERIOSCLEROSIS WITH HISTORY OF PREVIOUS STROKE: ICD-10-CM

## 2025-04-18 DIAGNOSIS — I10 HYPERTENSION, UNSPECIFIED TYPE: ICD-10-CM

## 2025-04-18 DIAGNOSIS — Z86.73 CEREBRAL ARTERIOSCLEROSIS WITH HISTORY OF PREVIOUS STROKE: ICD-10-CM

## 2025-04-18 LAB
ALBUMIN SERPL BCP-MCNC: 4.7 G/DL (ref 3.4–5)
ALP SERPL-CCNC: 77 U/L (ref 33–136)
ALT SERPL W P-5'-P-CCNC: 21 U/L (ref 10–52)
AMPHETAMINES UR QL SCN: NORMAL
ANION GAP SERPL CALC-SCNC: 14 MMOL/L (ref 10–20)
APPEARANCE UR: CLEAR
APTT PPP: 30 SECONDS (ref 26–36)
AST SERPL W P-5'-P-CCNC: 47 U/L (ref 9–39)
ATRIAL RATE: 64 BPM
BARBITURATES UR QL SCN: NORMAL
BASOPHILS # BLD AUTO: 0.03 X10*3/UL (ref 0–0.1)
BASOPHILS NFR BLD AUTO: 0.7 %
BENZODIAZ UR QL SCN: NORMAL
BILIRUB SERPL-MCNC: 0.7 MG/DL (ref 0–1.2)
BILIRUB UR STRIP.AUTO-MCNC: NEGATIVE MG/DL
BNP SERPL-MCNC: 57 PG/ML (ref 0–99)
BUN SERPL-MCNC: 12 MG/DL (ref 6–23)
BZE UR QL SCN: NORMAL
CALCIUM SERPL-MCNC: 9.2 MG/DL (ref 8.6–10.3)
CANNABINOIDS UR QL SCN: NORMAL
CARDIAC TROPONIN I PNL SERPL HS: 4 NG/L (ref 0–20)
CHLORIDE SERPL-SCNC: 105 MMOL/L (ref 98–107)
CHOLEST SERPL-MCNC: 290 MG/DL (ref 0–199)
CHOLESTEROL/HDL RATIO: 5.3
CO2 SERPL-SCNC: 21 MMOL/L (ref 21–32)
COLOR UR: NORMAL
CREAT SERPL-MCNC: 0.83 MG/DL (ref 0.5–1.3)
EGFRCR SERPLBLD CKD-EPI 2021: >90 ML/MIN/1.73M*2
EJECTION FRACTION APICAL 4 CHAMBER: 54
EJECTION FRACTION: 58 %
EOSINOPHIL # BLD AUTO: 0.1 X10*3/UL (ref 0–0.7)
EOSINOPHIL NFR BLD AUTO: 2.2 %
ERYTHROCYTE [DISTWIDTH] IN BLOOD BY AUTOMATED COUNT: 12.9 % (ref 11.5–14.5)
EST. AVERAGE GLUCOSE BLD GHB EST-MCNC: 91 MG/DL
FENTANYL+NORFENTANYL UR QL SCN: NORMAL
GLUCOSE BLD MANUAL STRIP-MCNC: 104 MG/DL (ref 74–99)
GLUCOSE BLD MANUAL STRIP-MCNC: 119 MG/DL (ref 74–99)
GLUCOSE BLD MANUAL STRIP-MCNC: 123 MG/DL (ref 74–99)
GLUCOSE BLD MANUAL STRIP-MCNC: 131 MG/DL (ref 74–99)
GLUCOSE BLD MANUAL STRIP-MCNC: 97 MG/DL (ref 74–99)
GLUCOSE SERPL-MCNC: 100 MG/DL (ref 74–99)
GLUCOSE UR STRIP.AUTO-MCNC: NORMAL MG/DL
HBA1C MFR BLD: 4.8 % (ref ?–5.7)
HCT VFR BLD AUTO: 45.2 % (ref 41–52)
HDLC SERPL-MCNC: 55.1 MG/DL
HGB BLD-MCNC: 15.2 G/DL (ref 13.5–17.5)
HOLD SPECIMEN: NORMAL
HOLD SPECIMEN: NORMAL
IMM GRANULOCYTES # BLD AUTO: 0.01 X10*3/UL (ref 0–0.7)
IMM GRANULOCYTES NFR BLD AUTO: 0.2 % (ref 0–0.9)
INR PPP: 0.9 (ref 0.9–1.1)
KETONES UR STRIP.AUTO-MCNC: NEGATIVE MG/DL
LDLC SERPL CALC-MCNC: ABNORMAL MG/DL
LEFT VENTRICLE INTERNAL DIMENSION DIASTOLE: 4.37 CM (ref 3.5–6)
LEFT VENTRICULAR OUTFLOW TRACT DIAMETER: 2.12 CM
LEUKOCYTE ESTERASE UR QL STRIP.AUTO: NEGATIVE
LV EJECTION FRACTION BIPLANE: 54 %
LYMPHOCYTES # BLD AUTO: 1.13 X10*3/UL (ref 1.2–4.8)
LYMPHOCYTES NFR BLD AUTO: 24.6 %
MCH RBC QN AUTO: 31.1 PG (ref 26–34)
MCHC RBC AUTO-ENTMCNC: 33.6 G/DL (ref 32–36)
MCV RBC AUTO: 92 FL (ref 80–100)
METHADONE UR QL SCN: NORMAL
MITRAL VALVE E/A RATIO: 0.56
MONOCYTES # BLD AUTO: 0.33 X10*3/UL (ref 0.1–1)
MONOCYTES NFR BLD AUTO: 7.2 %
NEUTROPHILS # BLD AUTO: 3 X10*3/UL (ref 1.2–7.7)
NEUTROPHILS NFR BLD AUTO: 65.1 %
NITRITE UR QL STRIP.AUTO: NEGATIVE
NON HDL CHOLESTEROL: 235 MG/DL (ref 0–149)
NRBC BLD-RTO: 0 /100 WBCS (ref 0–0)
OPIATES UR QL SCN: NORMAL
OXYCODONE+OXYMORPHONE UR QL SCN: NORMAL
P AXIS: 48 DEGREES
P OFFSET: 184 MS
P ONSET: 127 MS
PCP UR QL SCN: NORMAL
PH UR STRIP.AUTO: 5 [PH]
PLATELET # BLD AUTO: 207 X10*3/UL (ref 150–450)
POTASSIUM SERPL-SCNC: 5.7 MMOL/L (ref 3.5–5.3)
PR INTERVAL: 160 MS
PROT SERPL-MCNC: 7.3 G/DL (ref 6.4–8.2)
PROT UR STRIP.AUTO-MCNC: NEGATIVE MG/DL
PROTHROMBIN TIME: 10.4 SECONDS (ref 9.8–12.4)
Q ONSET: 207 MS
QRS COUNT: 11 BEATS
QRS DURATION: 112 MS
QT INTERVAL: 414 MS
QTC CALCULATION(BAZETT): 427 MS
QTC FREDERICIA: 422 MS
R AXIS: -38 DEGREES
RBC # BLD AUTO: 4.89 X10*6/UL (ref 4.5–5.9)
RBC # UR STRIP.AUTO: NEGATIVE MG/DL
RIGHT VENTRICLE FREE WALL PEAK S': 12 CM/S
RIGHT VENTRICLE PEAK SYSTOLIC PRESSURE: 29.2 MMHG
SODIUM SERPL-SCNC: 134 MMOL/L (ref 136–145)
SP GR UR STRIP.AUTO: 1.01
T AXIS: -1 DEGREES
T OFFSET: 414 MS
TRICUSPID ANNULAR PLANE SYSTOLIC EXCURSION: 1.8 CM
TRIGL SERPL-MCNC: 508 MG/DL (ref 0–149)
UROBILINOGEN UR STRIP.AUTO-MCNC: NORMAL MG/DL
VENTRICULAR RATE: 64 BPM
VLDL: ABNORMAL
WBC # BLD AUTO: 4.6 X10*3/UL (ref 4.4–11.3)

## 2025-04-18 PROCEDURE — 83036 HEMOGLOBIN GLYCOSYLATED A1C: CPT | Mod: STJLAB | Performed by: NURSE PRACTITIONER

## 2025-04-18 PROCEDURE — 2500000001 HC RX 250 WO HCPCS SELF ADMINISTERED DRUGS (ALT 637 FOR MEDICARE OP)

## 2025-04-18 PROCEDURE — 70450 CT HEAD/BRAIN W/O DYE: CPT

## 2025-04-18 PROCEDURE — 2020000001 HC ICU ROOM DAILY

## 2025-04-18 PROCEDURE — 96375 TX/PRO/DX INJ NEW DRUG ADDON: CPT

## 2025-04-18 PROCEDURE — 2500000001 HC RX 250 WO HCPCS SELF ADMINISTERED DRUGS (ALT 637 FOR MEDICARE OP): Performed by: NURSE PRACTITIONER

## 2025-04-18 PROCEDURE — 82947 ASSAY GLUCOSE BLOOD QUANT: CPT

## 2025-04-18 PROCEDURE — 2500000004 HC RX 250 GENERAL PHARMACY W/ HCPCS (ALT 636 FOR OP/ED): Performed by: STUDENT IN AN ORGANIZED HEALTH CARE EDUCATION/TRAINING PROGRAM

## 2025-04-18 PROCEDURE — 3E03317 INTRODUCTION OF OTHER THROMBOLYTIC INTO PERIPHERAL VEIN, PERCUTANEOUS APPROACH: ICD-10-PCS | Performed by: STUDENT IN AN ORGANIZED HEALTH CARE EDUCATION/TRAINING PROGRAM

## 2025-04-18 PROCEDURE — 70450 CT HEAD/BRAIN W/O DYE: CPT | Performed by: RADIOLOGY

## 2025-04-18 PROCEDURE — 85610 PROTHROMBIN TIME: CPT | Performed by: STUDENT IN AN ORGANIZED HEALTH CARE EDUCATION/TRAINING PROGRAM

## 2025-04-18 PROCEDURE — 99291 CRITICAL CARE FIRST HOUR: CPT | Performed by: INTERNAL MEDICINE

## 2025-04-18 PROCEDURE — 36415 COLL VENOUS BLD VENIPUNCTURE: CPT | Performed by: STUDENT IN AN ORGANIZED HEALTH CARE EDUCATION/TRAINING PROGRAM

## 2025-04-18 PROCEDURE — 80307 DRUG TEST PRSMV CHEM ANLYZR: CPT

## 2025-04-18 PROCEDURE — 93306 TTE W/DOPPLER COMPLETE: CPT

## 2025-04-18 PROCEDURE — 93005 ELECTROCARDIOGRAM TRACING: CPT

## 2025-04-18 PROCEDURE — 99291 CRITICAL CARE FIRST HOUR: CPT | Performed by: STUDENT IN AN ORGANIZED HEALTH CARE EDUCATION/TRAINING PROGRAM

## 2025-04-18 PROCEDURE — 71045 X-RAY EXAM CHEST 1 VIEW: CPT | Mod: FOREIGN READ | Performed by: RADIOLOGY

## 2025-04-18 PROCEDURE — 80061 LIPID PANEL: CPT | Performed by: NURSE PRACTITIONER

## 2025-04-18 PROCEDURE — 84075 ASSAY ALKALINE PHOSPHATASE: CPT | Performed by: STUDENT IN AN ORGANIZED HEALTH CARE EDUCATION/TRAINING PROGRAM

## 2025-04-18 PROCEDURE — 84484 ASSAY OF TROPONIN QUANT: CPT | Performed by: STUDENT IN AN ORGANIZED HEALTH CARE EDUCATION/TRAINING PROGRAM

## 2025-04-18 PROCEDURE — 71045 X-RAY EXAM CHEST 1 VIEW: CPT

## 2025-04-18 PROCEDURE — 81003 URINALYSIS AUTO W/O SCOPE: CPT | Performed by: STUDENT IN AN ORGANIZED HEALTH CARE EDUCATION/TRAINING PROGRAM

## 2025-04-18 PROCEDURE — G0427 INPT/ED TELECONSULT70: HCPCS | Performed by: STUDENT IN AN ORGANIZED HEALTH CARE EDUCATION/TRAINING PROGRAM

## 2025-04-18 PROCEDURE — 93306 TTE W/DOPPLER COMPLETE: CPT | Performed by: NURSE PRACTITIONER

## 2025-04-18 PROCEDURE — 92611 MOTION FLUOROSCOPY/SWALLOW: CPT | Mod: GN | Performed by: STUDENT IN AN ORGANIZED HEALTH CARE EDUCATION/TRAINING PROGRAM

## 2025-04-18 PROCEDURE — 99285 EMERGENCY DEPT VISIT HI MDM: CPT | Mod: 25 | Performed by: STUDENT IN AN ORGANIZED HEALTH CARE EDUCATION/TRAINING PROGRAM

## 2025-04-18 PROCEDURE — 2500000004 HC RX 250 GENERAL PHARMACY W/ HCPCS (ALT 636 FOR OP/ED): Mod: JZ

## 2025-04-18 PROCEDURE — 85730 THROMBOPLASTIN TIME PARTIAL: CPT | Performed by: STUDENT IN AN ORGANIZED HEALTH CARE EDUCATION/TRAINING PROGRAM

## 2025-04-18 PROCEDURE — 83880 ASSAY OF NATRIURETIC PEPTIDE: CPT | Performed by: NURSE PRACTITIONER

## 2025-04-18 PROCEDURE — 96374 THER/PROPH/DIAG INJ IV PUSH: CPT

## 2025-04-18 PROCEDURE — 85025 COMPLETE CBC W/AUTO DIFF WBC: CPT | Performed by: STUDENT IN AN ORGANIZED HEALTH CARE EDUCATION/TRAINING PROGRAM

## 2025-04-18 RX ORDER — LORAZEPAM 2 MG/ML
1 INJECTION INTRAMUSCULAR EVERY 2 HOUR PRN
Status: DISCONTINUED | OUTPATIENT
Start: 2025-04-18 | End: 2025-04-20

## 2025-04-18 RX ORDER — HYDRALAZINE HYDROCHLORIDE 20 MG/ML
10 INJECTION INTRAMUSCULAR; INTRAVENOUS
Status: DISCONTINUED | OUTPATIENT
Start: 2025-04-18 | End: 2025-04-20

## 2025-04-18 RX ORDER — LANOLIN ALCOHOL/MO/W.PET/CERES
100 CREAM (GRAM) TOPICAL DAILY
Status: DISCONTINUED | OUTPATIENT
Start: 2025-04-18 | End: 2025-04-20 | Stop reason: HOSPADM

## 2025-04-18 RX ORDER — LORAZEPAM 2 MG/ML
2 INJECTION INTRAMUSCULAR EVERY 2 HOUR PRN
Status: DISCONTINUED | OUTPATIENT
Start: 2025-04-18 | End: 2025-04-20

## 2025-04-18 RX ORDER — LABETALOL HYDROCHLORIDE 5 MG/ML
10 INJECTION, SOLUTION INTRAVENOUS EVERY 10 MIN PRN
Status: DISCONTINUED | OUTPATIENT
Start: 2025-04-18 | End: 2025-04-20

## 2025-04-18 RX ORDER — MULTIVIT-MIN/IRON FUM/FOLIC AC 7.5 MG-4
1 TABLET ORAL DAILY
Status: DISCONTINUED | OUTPATIENT
Start: 2025-04-18 | End: 2025-04-20 | Stop reason: HOSPADM

## 2025-04-18 RX ORDER — LORAZEPAM 2 MG/ML
0.5 INJECTION INTRAMUSCULAR EVERY 2 HOUR PRN
Status: DISCONTINUED | OUTPATIENT
Start: 2025-04-18 | End: 2025-04-20

## 2025-04-18 RX ORDER — FOLIC ACID 1 MG/1
1 TABLET ORAL DAILY
Status: DISCONTINUED | OUTPATIENT
Start: 2025-04-18 | End: 2025-04-20 | Stop reason: HOSPADM

## 2025-04-18 RX ORDER — PANTOPRAZOLE SODIUM 40 MG/10ML
40 INJECTION, POWDER, LYOPHILIZED, FOR SOLUTION INTRAVENOUS ONCE
Status: COMPLETED | OUTPATIENT
Start: 2025-04-18 | End: 2025-04-18

## 2025-04-18 RX ORDER — LABETALOL HYDROCHLORIDE 5 MG/ML
20 INJECTION, SOLUTION INTRAVENOUS ONCE
Status: COMPLETED | OUTPATIENT
Start: 2025-04-18 | End: 2025-04-18

## 2025-04-18 RX ORDER — ATORVASTATIN CALCIUM 40 MG/1
40 TABLET, FILM COATED ORAL NIGHTLY
Status: DISCONTINUED | OUTPATIENT
Start: 2025-04-18 | End: 2025-04-19

## 2025-04-18 RX ADMIN — LABETALOL HYDROCHLORIDE 20 MG: 5 INJECTION, SOLUTION INTRAVENOUS at 09:01

## 2025-04-18 RX ADMIN — ATORVASTATIN CALCIUM 40 MG: 40 TABLET, FILM COATED ORAL at 21:30

## 2025-04-18 RX ADMIN — Medication 1 TABLET: at 12:47

## 2025-04-18 RX ADMIN — THIAMINE HCL TAB 100 MG 100 MG: 100 TAB at 12:48

## 2025-04-18 RX ADMIN — FOLIC ACID 1 MG: 1 TABLET ORAL at 12:47

## 2025-04-18 RX ADMIN — Medication 20 MG: at 09:15

## 2025-04-18 RX ADMIN — PANTOPRAZOLE SODIUM 40 MG: 40 INJECTION, POWDER, FOR SOLUTION INTRAVENOUS at 12:48

## 2025-04-18 SDOH — HEALTH STABILITY: MENTAL HEALTH: HOW OFTEN DO YOU HAVE SIX OR MORE DRINKS ON ONE OCCASION?: LESS THAN MONTHLY

## 2025-04-18 SDOH — ECONOMIC STABILITY: HOUSING INSECURITY: AT ANY TIME IN THE PAST 12 MONTHS, WERE YOU HOMELESS OR LIVING IN A SHELTER (INCLUDING NOW)?: NO

## 2025-04-18 SDOH — SOCIAL STABILITY: SOCIAL INSECURITY: HAS ANYONE EVER THREATENED TO HURT YOUR FAMILY OR YOUR PETS?: NO

## 2025-04-18 SDOH — SOCIAL STABILITY: SOCIAL INSECURITY: WITHIN THE LAST YEAR, HAVE YOU BEEN AFRAID OF YOUR PARTNER OR EX-PARTNER?: NO

## 2025-04-18 SDOH — SOCIAL STABILITY: SOCIAL INSECURITY: WITHIN THE LAST YEAR, HAVE YOU BEEN HUMILIATED OR EMOTIONALLY ABUSED IN OTHER WAYS BY YOUR PARTNER OR EX-PARTNER?: NO

## 2025-04-18 SDOH — SOCIAL STABILITY: SOCIAL INSECURITY: WERE YOU ABLE TO COMPLETE ALL THE BEHAVIORAL HEALTH SCREENINGS?: YES

## 2025-04-18 SDOH — ECONOMIC STABILITY: FOOD INSECURITY: WITHIN THE PAST 12 MONTHS, YOU WORRIED THAT YOUR FOOD WOULD RUN OUT BEFORE YOU GOT THE MONEY TO BUY MORE.: NEVER TRUE

## 2025-04-18 SDOH — SOCIAL STABILITY: SOCIAL INSECURITY: ARE YOU OR HAVE YOU BEEN THREATENED OR ABUSED PHYSICALLY, EMOTIONALLY, OR SEXUALLY BY ANYONE?: NO

## 2025-04-18 SDOH — ECONOMIC STABILITY: HOUSING INSECURITY: IN THE LAST 12 MONTHS, WAS THERE A TIME WHEN YOU WERE NOT ABLE TO PAY THE MORTGAGE OR RENT ON TIME?: NO

## 2025-04-18 SDOH — SOCIAL STABILITY: SOCIAL INSECURITY: ABUSE: ADULT

## 2025-04-18 SDOH — SOCIAL STABILITY: SOCIAL INSECURITY: DO YOU FEEL UNSAFE GOING BACK TO THE PLACE WHERE YOU ARE LIVING?: NO

## 2025-04-18 SDOH — ECONOMIC STABILITY: FOOD INSECURITY: WITHIN THE PAST 12 MONTHS, THE FOOD YOU BOUGHT JUST DIDN'T LAST AND YOU DIDN'T HAVE MONEY TO GET MORE.: NEVER TRUE

## 2025-04-18 SDOH — SOCIAL STABILITY: SOCIAL INSECURITY: ARE THERE ANY APPARENT SIGNS OF INJURIES/BEHAVIORS THAT COULD BE RELATED TO ABUSE/NEGLECT?: NO

## 2025-04-18 SDOH — HEALTH STABILITY: MENTAL HEALTH: HOW MANY DRINKS CONTAINING ALCOHOL DO YOU HAVE ON A TYPICAL DAY WHEN YOU ARE DRINKING?: 3 OR 4

## 2025-04-18 SDOH — SOCIAL STABILITY: SOCIAL INSECURITY: HAVE YOU HAD THOUGHTS OF HARMING ANYONE ELSE?: NO

## 2025-04-18 SDOH — SOCIAL STABILITY: SOCIAL INSECURITY: HAVE YOU HAD ANY THOUGHTS OF HARMING ANYONE ELSE?: NO

## 2025-04-18 SDOH — SOCIAL STABILITY: SOCIAL INSECURITY: DOES ANYONE TRY TO KEEP YOU FROM HAVING/CONTACTING OTHER FRIENDS OR DOING THINGS OUTSIDE YOUR HOME?: NO

## 2025-04-18 SDOH — HEALTH STABILITY: MENTAL HEALTH: HOW OFTEN DO YOU HAVE A DRINK CONTAINING ALCOHOL?: 4 OR MORE TIMES A WEEK

## 2025-04-18 SDOH — ECONOMIC STABILITY: INCOME INSECURITY: IN THE PAST 12 MONTHS HAS THE ELECTRIC, GAS, OIL, OR WATER COMPANY THREATENED TO SHUT OFF SERVICES IN YOUR HOME?: NO

## 2025-04-18 SDOH — ECONOMIC STABILITY: FOOD INSECURITY: HOW HARD IS IT FOR YOU TO PAY FOR THE VERY BASICS LIKE FOOD, HOUSING, MEDICAL CARE, AND HEATING?: NOT HARD AT ALL

## 2025-04-18 SDOH — SOCIAL STABILITY: SOCIAL INSECURITY: DO YOU FEEL ANYONE HAS EXPLOITED OR TAKEN ADVANTAGE OF YOU FINANCIALLY OR OF YOUR PERSONAL PROPERTY?: NO

## 2025-04-18 SDOH — ECONOMIC STABILITY: HOUSING INSECURITY: IN THE PAST 12 MONTHS, HOW MANY TIMES HAVE YOU MOVED WHERE YOU WERE LIVING?: 0

## 2025-04-18 SDOH — ECONOMIC STABILITY: TRANSPORTATION INSECURITY: IN THE PAST 12 MONTHS, HAS LACK OF TRANSPORTATION KEPT YOU FROM MEDICAL APPOINTMENTS OR FROM GETTING MEDICATIONS?: NO

## 2025-04-18 ASSESSMENT — LIFESTYLE VARIABLES
SUBSTANCE_ABUSE_PAST_12_MONTHS: NO
SKIP TO QUESTIONS 9-10: 0
VISUAL DISTURBANCES: NOT PRESENT
AUDIT-C TOTAL SCORE: 6
AGITATION: NORMAL ACTIVITY
AGITATION: NORMAL ACTIVITY
HOW OFTEN DO YOU HAVE A DRINK CONTAINING ALCOHOL: 4 OR MORE TIMES A WEEK
PRESCIPTION_ABUSE_PAST_12_MONTHS: NO
ANXIETY: NO ANXIETY, AT EASE
EVER FELT BAD OR GUILTY ABOUT YOUR DRINKING: NO
TOTAL SCORE: 0
HEADACHE, FULLNESS IN HEAD: NOT PRESENT
NAUSEA AND VOMITING: NO NAUSEA AND NO VOMITING
HEADACHE, FULLNESS IN HEAD: NOT PRESENT
PAROXYSMAL SWEATS: NO SWEAT VISIBLE
TREMOR: NO TREMOR
ANXIETY: NO ANXIETY, AT EASE
HAVE YOU EVER FELT YOU SHOULD CUT DOWN ON YOUR DRINKING: NO
ANXIETY: NO ANXIETY, AT EASE
SKIP TO QUESTIONS 9-10: 0
ORIENTATION AND CLOUDING OF SENSORIUM: ORIENTED AND CAN DO SERIAL ADDITIONS
NAUSEA AND VOMITING: NO NAUSEA AND NO VOMITING
AUDITORY DISTURBANCES: NOT PRESENT
ANXIETY: NO ANXIETY, AT EASE
AUDITORY DISTURBANCES: NOT PRESENT
TOTAL SCORE: 0
PAROXYSMAL SWEATS: NO SWEAT VISIBLE
ORIENTATION AND CLOUDING OF SENSORIUM: ORIENTED AND CAN DO SERIAL ADDITIONS
TOTAL SCORE: 0
TOTAL SCORE: 0
HAVE PEOPLE ANNOYED YOU BY CRITICIZING YOUR DRINKING: NO
HOW MANY STANDARD DRINKS CONTAINING ALCOHOL DO YOU HAVE ON A TYPICAL DAY: 3 OR 4
VISUAL DISTURBANCES: NOT PRESENT
NAUSEA AND VOMITING: NO NAUSEA AND NO VOMITING
AGITATION: NORMAL ACTIVITY
TREMOR: NO TREMOR
TREMOR: NO TREMOR
HEADACHE, FULLNESS IN HEAD: NOT PRESENT
AUDIT-C TOTAL SCORE: 6
EVER HAD A DRINK FIRST THING IN THE MORNING TO STEADY YOUR NERVES TO GET RID OF A HANGOVER: NO
AUDITORY DISTURBANCES: NOT PRESENT
ORIENTATION AND CLOUDING OF SENSORIUM: ORIENTED AND CAN DO SERIAL ADDITIONS
AUDIT-C TOTAL SCORE: 6
VISUAL DISTURBANCES: NOT PRESENT
NAUSEA AND VOMITING: NO NAUSEA AND NO VOMITING
PAROXYSMAL SWEATS: NO SWEAT VISIBLE
PAROXYSMAL SWEATS: NO SWEAT VISIBLE
TOTAL SCORE: 0
HEADACHE, FULLNESS IN HEAD: NOT PRESENT
VISUAL DISTURBANCES: NOT PRESENT
HOW OFTEN DO YOU HAVE 6 OR MORE DRINKS ON ONE OCCASION: LESS THAN MONTHLY
TREMOR: NO TREMOR
AUDITORY DISTURBANCES: NOT PRESENT
AGITATION: NORMAL ACTIVITY
ORIENTATION AND CLOUDING OF SENSORIUM: ORIENTED AND CAN DO SERIAL ADDITIONS

## 2025-04-18 ASSESSMENT — COGNITIVE AND FUNCTIONAL STATUS - GENERAL
MOVING FROM LYING ON BACK TO SITTING ON SIDE OF FLAT BED WITH BEDRAILS: A LITTLE
PATIENT BASELINE BEDBOUND: NO
MOBILITY SCORE: 22
DAILY ACTIVITIY SCORE: 21
STANDING UP FROM CHAIR USING ARMS: A LITTLE
HELP NEEDED FOR BATHING: A LITTLE
TURNING FROM BACK TO SIDE WHILE IN FLAT BAD: A LITTLE
MOVING TO AND FROM BED TO CHAIR: A LITTLE
WALKING IN HOSPITAL ROOM: A LITTLE
CLIMB 3 TO 5 STEPS WITH RAILING: A LITTLE
DAILY ACTIVITIY SCORE: 24
CLIMB 3 TO 5 STEPS WITH RAILING: A LITTLE
DRESSING REGULAR UPPER BODY CLOTHING: A LITTLE
DRESSING REGULAR LOWER BODY CLOTHING: A LITTLE
MOBILITY SCORE: 18
WALKING IN HOSPITAL ROOM: A LITTLE

## 2025-04-18 ASSESSMENT — PAIN SCALES - GENERAL
PAINLEVEL_OUTOF10: 0 - NO PAIN

## 2025-04-18 ASSESSMENT — PAIN - FUNCTIONAL ASSESSMENT
PAIN_FUNCTIONAL_ASSESSMENT: 0-10

## 2025-04-18 ASSESSMENT — ACTIVITIES OF DAILY LIVING (ADL)
BATHING: INDEPENDENT
PATIENT'S MEMORY ADEQUATE TO SAFELY COMPLETE DAILY ACTIVITIES?: YES
WALKS IN HOME: INDEPENDENT
JUDGMENT_ADEQUATE_SAFELY_COMPLETE_DAILY_ACTIVITIES: YES
HEARING - LEFT EAR: FUNCTIONAL
GROOMING: INDEPENDENT
HEARING - RIGHT EAR: FUNCTIONAL
ADEQUATE_TO_COMPLETE_ADL: YES
TOILETING: INDEPENDENT
FEEDING YOURSELF: INDEPENDENT
DRESSING YOURSELF: INDEPENDENT
LACK_OF_TRANSPORTATION: NO
LACK_OF_TRANSPORTATION: NO

## 2025-04-18 ASSESSMENT — PATIENT HEALTH QUESTIONNAIRE - PHQ9
2. FEELING DOWN, DEPRESSED OR HOPELESS: NOT AT ALL
1. LITTLE INTEREST OR PLEASURE IN DOING THINGS: NOT AT ALL
SUM OF ALL RESPONSES TO PHQ9 QUESTIONS 1 & 2: 0

## 2025-04-18 ASSESSMENT — COLUMBIA-SUICIDE SEVERITY RATING SCALE - C-SSRS
2. HAVE YOU ACTUALLY HAD ANY THOUGHTS OF KILLING YOURSELF?: NO
6. HAVE YOU EVER DONE ANYTHING, STARTED TO DO ANYTHING, OR PREPARED TO DO ANYTHING TO END YOUR LIFE?: NO
1. IN THE PAST MONTH, HAVE YOU WISHED YOU WERE DEAD OR WISHED YOU COULD GO TO SLEEP AND NOT WAKE UP?: NO

## 2025-04-18 NOTE — PROGRESS NOTES
Speech-Language Pathology    SLP Adult Inpatient Speech-Language Pathology Clinical Swallow Evaluation    Patient Name: Donnie Nogueira  MRN: [id number]  Today's Date: 4/18/2025   Time Calculation  Start Time: 1341  Stop Time: 1349  Time Calculation (min): 8 min         Current Problem:   1. Cerebrovascular accident (CVA), unspecified mechanism (Multi)  Transthoracic Echo (TTE) Complete    Transthoracic Echo (TTE) Complete    CANCELED: Transthoracic Echo (TTE) Complete    CANCELED: Transthoracic Echo (TTE) Complete      2. Cerebral arteriosclerosis with history of previous stroke  CANCELED: Transthoracic Echo (TTE) Complete    CANCELED: Transthoracic Echo (TTE) Complete      3. Dizziness  Transthoracic Echo (TTE) Complete    Transthoracic Echo (TTE) Complete      4. 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  Transthoracic Echo (TTE) Complete    Transthoracic Echo (TTE) Complete        Recommendations:  Risk for Aspiration: No   Solid Diet Recommendations : Regular (IDDSI Level 7)   Liquid Diet Recommendations: Thin (IDDSI Level 0)   Compensatory Swallowing Strategies: Upright 90 degrees as possible for all oral intake, Remain upright for 20-30 minutes after meals, Alternate solids and liquids, Small bites/sips, Eat/feed slowly            Assessment:  Consistencies Trialed: Consistencies Trialed: Thin (IDDSI Level 0) - Cup, Regular (IDDSI Level 7)           Swallow Impression:  Patient presents with suspected functional oropharyngeal swallow upon completion of clinical swallow evaluation at bedside this date. Examination of oral mechanism was unremarkable. Patient tolerated PO trials of thin liquids via cup and regular solids (lunch meal) absent of overt s/s of aspiration. Per this assessment, patient is appropriate to continue baseline diet as medically indicated with standard swallow precautions. Although s/s of aspiration were not observed during the CSE, silent aspiration can only be ruled out with instrumental  assessment, but is not highly suspected at this time. Should there be concern for aspiration-related complications, would recommend re-consulting ST for MBSS. ST to sign off at this time but will remain available for re-consultation should any concerns arise.        Medical Staff Made Aware: Yes     Baseline Assessment:  Baseline Assessment  Respiratory Status: Room air  Behavior/Cognition: Alert, Cooperative  Patient Positioning: Upright in Bed     Relevant Imaging:  CT head IMPRESSION:  No CT evidence of acute intracranial hemorrhage or mass effect.    CXR IMPRESSION:  No acute cardiopulmonary process.    Subjective   Patient was pleasant and very much enjoying his lunch and cup of coffee.    Plan:  SLP Plan: No skilled SLP Skilled speech therapy for dysphagia treatment is warranted in order to provide training and instruction regarding the use of compensatory swallow strategies, oropharyngeal strengthening exercises, and pt/caregiver education in order to reduce risk of aspiration, dehydration and malnutrition.              Discussed POC: Patient   Discussed Risks/Benefits: Patient, Yes   Patient/Caregiver Agreeable: Yes   Patient will be discontinued from speech therapy when no further skilled needs are identified in this setting.   SLP Discharge Recommendations: Home with no further SLP    General Visit Information:  Pt. Admitted  4/18/25  Past medical history: Past Medical History Relevant to Rehab: 65 y.o. male admitted on 4/18/2025  8:39 AM presented with right hemiparesis consistent with acute stroke, received TNK and was admitted to ICU for post thrombolysis care.    Living Environment: Home, Live with __ (spouse)     Ordering Physician: Regina Marie MD     Reason for Referral: Patient was seen for a clinical swallow evaluation (CSE) to assess swallow function, determine least restrictive diet, determine if a modified barium swallow study is warranted and develop appropriate POC for the current  setting.  [employer] Diet: regular/thin   Current Diet : regular/thin       Pain:  Pain Assessment  Pain Assessment: 0-10  0-10 (Numeric) Pain Score: 0 - No pain     Education:  Learner:  Patient  Barriers to Learning: None  Method: Verbal  Education - Topic: ST provided patient education regarding role of ST, purpose of assessment, clinical impressions, goals of treatment, and plan of care. Patient verbalized full comprehension, consistent with cognitive status. Education will be reinforced. ST further coordinated with RN regarding recommendations and precautions per this assessment, with RN verbalizing understanding.  Outcome:  Verbalized understanding and agreement, Meets goals/outcomes

## 2025-04-18 NOTE — CARE PLAN
The patient's goals for the shift include  patient will regain full movement of R arm and R leg.    The clinical goals for the shift include patient will not have any changes in neurologic status by end of shift.     Over the shift, the patient did not make progress toward the following goals. Barriers to progression include stroke, elevated cholesterol, daily drinker, second-hand smoke exposure, hypertension. Recommendations to address these barriers include education, frequent neurologic assessment, labs.

## 2025-04-18 NOTE — CONSULTS
Consults    65-year-old male.    Came in with right-sided weakness.  Woke up at 5 AM.  Then around 7 AM felt right leg numbness.  Tried to get up out of the chair but could not control the right leg.  Then felt right arm becoming heavy as well blood pressure was 206/93.  In the ED had right hemiparesis.  This improved after he came back from CT but not back to baseline.  Blood pressure 188/98 in the ER.  No fever    NIH stroke scale was 1  He had minor right hand weakness with decreased dexterity  He had difficulty walking    Because of disabling symptoms he received tenecteplase in the ER.  He received the tenecteplase April 18, 2025---0910 hours  Current blood pressure reading is 140/96    Patient tells me that he has had recurrent episodes of weakness right upper limb and right lower limb even today post tenecteplase.  Sometimes short-lived and sometimes longer duration.  He is frustrated.  There is no deflection of the face or eyes.  No speech or swallowing difficulty.  No headache.  No history of seizures.    He does not smoke.  In November last year he had dizziness.  It was thought to be benign positional vertigo.    Other relevant history-  Benign enlargement of the prostate, vertigo.    CT head-I independently reviewed the images-decreased density and small lacunar distribution right basal ganglia and left internal capsule.  I compared this with CT brain from 11/25/2024 and at that time there was no significant abnormality on the CT brain.    Examination:    Neurologic exam:  Mental status : speech, language, attention, concentration, orientation to time/Place/person/situation, fund of knowledge were normal.  Cranial nerves: cranial nerves were examined.  Pupils were equal and reactive to light.  External ocular movements were normal.  Visual fields were normal.  Face was symmetric.  Tongue was in the midline.  Soft palate move normally.  Facial sensations were normal.  Hearing was normal.  Neck :  supple.  Gait and Station : Not evaluated  Strength : normal.  Muscle tone : normal.  Abnormal involuntary movements : none.  Atrophy/fasciculations : none.  Muscle stretch reflexes : normal.  Pathologic reflexes : absent.  Sensory : normal.  Coordination : normal.  Cardiovascular : normal first and second heart sounds.  No murmur.  No cranial or cervical bruits.    Chest : chest expansion was normal.  Breath sounds normal.  No adventitious sounds.    Impression discussion plan    Recurrent episodes of weakness right upper limb right lower limb-hemiparesis-most likely due to microangiopathy related to hypertension.  He received tenecteplase at 0910 hrs. today morning.  Recurrent stroke.  Versus recurrent transient cerebral ischemic attack.-Left cerebral hemisphere  CT brain showed microangiopathy related changes left internal capsule region and right basal ganglia region.  These were remote changes.    No evidence of intracranial bleeding.    Good control of blood pressure emphasized.  Check echocardiogram  Check MRI studies of the brain and MRA studies of the cerebrovascular circuit.  Needs to be monitored closely in the intensive care unit to look for any bleeding.    Risk factor modification discussed in detail.    Start antiplatelet/aspirin tomorrow morning after 9:00 AM.  Statins.  Close monitoring of blood pressure.    Differential diagnosis pathogenesis prognosis discussed in detail with the patient  I communicated with the attending physician

## 2025-04-18 NOTE — ED PROVIDER NOTES
Emergency Department Provider Note        History of Present Illness     History provided by: Patient  Limitations to History: None  External Records Reviewed with Brief Summary: None    HPI:  Donnie Nogueira is a 65 y.o. male with BPH, vertigo presenting for right arm and leg weakness and numbness.  Patient woke up at 7 in the morning and felt fine.  Not too long afterwards, he started feeling as if his right arm could not work as well as it should.  He describes his right leg as being way down by a lead rubbery.  He has some numbness in his arm and leg as well.  He called 911.  He still having the symptoms upon arrival.  Patient's does not have any aphasia, dysarthria, facial droop, neglect, or vision changes.  He is taken to the CT scanner promptly.  Telestroke neurologist was notified promptly.  Upon return to the examination room, he reports that some of his symptoms are improving.  However, upon attempting to ambulate, he is unable to bear weight on that leg.  Patient is still relatively active.  He still works on cars on the side.  He does not feel that with these deficits he would be able to complete his daily activities or things that he enjoys.    Last Known Well Time: 0700 on 4/18/2025    Physical Exam   Triage vitals:  T 37.3 °C (99.1 °F)  HR 71  BP (!) 188/98  RR 14  O2 98 % None (Room air)    General: Awake, alert, in no acute distress  Eyes: Gaze conjugate.  No scleral icterus or injection  HENT: Normo-cephalic, atraumatic. No stridor.  CV: Regular rate, regular rhythm. Radial pulses 2+ bilaterally  Resp: Breathing non-labored, speaking in full sentences.  Clear to auscultation bilaterally  GI: Soft, non-distended, non-tender. No rebound or guarding.  MSK/Extremities: No gross bony deformities. Moving all extremities  Skin: Warm. Appropriate color  Neuro: see below for NIHSS  Psych: Appropriate mood and affect    NIH Stroke Scale  Time: 0700   NIHSS:   NIH Stroke Scale:     1A. Level of  Consciousness:  Alert (keenly responsive) (0)    1B. Ask Month and Age:  Both questions right (0)    1C. Blink Eyes & Squeeze Hands:  Performs both tasks (0)    2. Best Gaze:  Normal (0)    3. Visual:  No visual loss (0)    4. Facial Palsy:  Normal symmetry (0)    5A. Motor - Left Arm:  No drift (0)    5B. Motor - Right Arm:  No drift (0)    6A. Motor - Left Leg:  No drift (0)    6B. Motor - Right Leg:  No drift (0)    7. Limb Ataxia:  Ataxia in 1 limb (+1)    8. Sensory Loss:  Mild-moderate loss (+1)    9. Best Language:  Normal (no aphasia) (0)    10. Dysarthia:  Normal (0)    11. Extinction and Inattention:  No abnormality (0)    NIH Stroke Scale:  2      VAN: Negative    Medical Decision Making & ED Course   Medical Decision Makin y.o. male presenting with arm and leg weakness.  He does not have any drift of his extremities but he does feel subjectively weaker and he does have some mild ataxia with his right arm.  He reports decreased sensation in his right arm and leg.  No facial droop, slurred speech, dysarthria, aphasia, neglect, vision changes. VAN negative.  He is within the window for tenecteplase.  Pharmacy notified.  He is taken to CT scanner promptly.  Discussed with telestroke.    Patient was evaluated by telestroke neurologist.  His symptoms are beginning to improve.  However, upon attempt to ambulate, he was unable to bear weight.  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  He feels that these symptoms will be debilitating to him.  In light of this, we recommended thrombolytics.  There was a delay as the patient was significantly hypertensive with systolics in the 200s and he was not sure if he wanted the tenecteplase due to risk of bleeding.  After discussion of the risks and benefits, he agreed with the tenecteplase. Patient was given labetalol 20 mg IV and his blood pressure decreased to 151/102. Tenecteplase was administered at 0915.  Per telestroke neurology, patient is not a  thrombectomy candidate and can remain here at [place].    CBC is negative for leukocytosis, anemia, thrombocytopenia.  Remainder of labs to be followed up on by ICU team.    Patient will be admitted to the ICU for frequent neuro checks and monitoring.  Discussed with the ICU attending.  ICU attending and resident evaluate the patient in the emergency department.  Patient has been accepted.    Juma Cardoso DO, PGY 4  Emergency Medicine Resident    Social Determinants of Health which Significantly Impact Care: None identified     EKG Independent Interpretation: EKG interpreted by myself. Please see ED Course for full interpretation.    Independent Result Review and Interpretation: Relevant laboratory and radiographic results were reviewed and independently interpreted by myself.  As necessary, they are commented on in the ED Course.    Chronic conditions affecting the patient's care: As documented above in [facility]    The patient was discussed with the following consultants/services:  Neurology, ICU    Care Considerations: As documented above in [facility]    IV Thrombolysis IV Thrombolysis Checklist        IV Thrombolysis Given: Yes Thrombolysis Administration: administration time 0915 Patient meets inclusion and exclusion criteria with expected benefits exceeding the risks of IV thrombolysis therapy or withholding therapy. Patient/ family understand potential risks & benefits and consent to IV Thrombolysis. Discussed the diagnosis of suspected ischemic stroke and options for treatment, including alternative treatments. For patients meeting inclusion and exclusion criteria, the expected benefits exceed the risks of IV thrombolysis therapy or withholding therapy. The main risk of IV thrombolysis therapy is bleeding into the brain or body that may require blood transfusions or lead to death. In clinical studies, the rate of death was not higher in patients who received IV thrombolysis compared to those who did not. Other risks  include allergic reactions, and with any procedure there is always the possibility of an unexpected complication.  Patient is <18 - refer [facility] Emergency Management of pediatric patients with Acute suspected Stroke & the Pediatric IV Thrombolysis Consent. Consent is completed on a paper document and if criteria is met/ benefit outweigh the risk the thrombolytic Alteplase should be given.  :99}Were there delays to thrombolysis administration?: Yes difficulty controlling HTN to target BP to meet eligibility and other additional risks and benefits discussion with patient regarding thrombolytics      ED Course:  ED Course as of 04/18/25 0939   Fri Apr 18, 2025   0857 Lacunar infarcts that are chronic noted by radiology [TL]   0909 Patient concerned with ambulation.  Telestroke neurology team is recommending at this time.  Given 20 labetalol which did delay administration as patient had hypertension above parameters.  Checklist reviewed with the patient with no known contraindication. [TL]   0923 Van negative.  [TL]      ED Course User Index  [TL] Carlos Rosario DO         Diagnoses as of 04/18/25 0939   Cerebrovascular accident (CVA), unspecified mechanism (Multi)       Disposition   As a result of their workup, the patient will require admission to the hospital.  The patient was informed of his diagnosis.  The patient was given the opportunity to ask questions and I answered them. The patient agreed to be admitted to the hospital.    Procedures   Critical Care    Performed by: Carlos Rosario DO  Authorized by: Carlos Rosario DO    Critical care provider statement:     Critical care time (minutes):  55    Critical care time was exclusive of:  Separately billable procedures and treating other patients and teaching time    Critical care was necessary to treat or prevent imminent or life-threatening deterioration of the following conditions:  CNS failure or compromise    Critical care was time spent personally by  me on the following activities:  Ordering and performing treatments and interventions, ordering and review of laboratory studies, ordering and review of radiographic studies, pulse oximetry, re-evaluation of patient's condition, review of old charts, obtaining history from patient or surrogate, evaluation of patient's response to treatment, development of treatment plan with patient or surrogate, discussions with consultants and examination of patient    Care discussed with: admitting provider        Patient seen and discussed with ED attending physician.    Juma Cardoso DO  Emergency Medicine     Juma Cardoso DO  Resident  04/18/25 0939      I performed a history and physical examination of the patient and discussed his management with the resident physician.  I agree with the history, physical, assessment, and plan of care, with the following exceptions:   None    I was present for key and critical portions of the following procedures: Critical care  Time Spent in Critical Care of the patient: 55  Time spent in discussions with the patient and family: 40    DO Carlos Lagos DO  04/19/25 1100

## 2025-04-18 NOTE — H&P
Critical Care Medicine History and Physical        Subjective   Patient is a 65 y.o. male admitted on 4/18/2025  8:39 AM presented with right hemiparesis consistent with acute stroke, received TNK and was admitted to ICU for post thrombolysis care.     MARCIA Nogueira is a 65 y.o. year old male patient with PMHx of BPH, vertigo who presented to the ED complaining of right arm and leg weakness and numbness (right hemiparesis leg> arm) that started this morning.  He reported no issues the night prior, woke up at 7 AM and started feeling a gradual weakness in his right arm and right leg accompanied with numbness.  He called 911, symptoms persisted.  He did not experience any aphasia, dysarthria, facial droop, neglect, vision changes.  Upon presenting to ED, CT scan was performed immediately, telestroke neurologist was notified.  He reported improvement in the weakness, however he was unable to bear weight on his right leg (functions did not go back to baseline).  He denies headache, fever, rigors, chest pain, dyspnea, nausea, vomiting, abdominal pain, urinary signs and symptoms.  NIH score 2 for limb ataxia and mild sensory loss.  As per teleneurology, patient was not a candidate for thrombectomy.  Patient received TNK at 9:15 AM.  Patient working car repair, usually active, he is not a smoker (even though he is a secondhand smoker as his wife smokes nicotine), drinks 4 beers daily, no illicit drug use reported.    ED course:   Vital signs:  /98, HR 71, RR 14, afebrile 37.3, SpO2 98% on RA.  Labs:  Unremarkable CBC.  Chemistry with , sodium 134, potassium 5.7, AST 47.    EKG:  NSR, PVCs, left axis deviation, VR 64, , QTc 427.    Imaging: CXR with no acute cardiopulmonary process. CT brain attack with age-related atrophy and mild small vessel ischemic changes in the cerebral white matter, chronic appearing lacunar infarcts are identified in the right caudate head and left internal capsule.  No CT  evidence of acute intracranial hemorrhage or mass effect.  Interventions: Patient received labetalol 20 mg, teleneuro was consulted, patient received TNK 20 mg in the ED and was then admitted to ICU for post thrombolysis care.    Medical History[1]  Surgical History[2]  Prescriptions Prior to Admission[3]  Patient has no known allergies.  Social History[4]  Family History[5]    Review of Systems:  Pertinent positives and negatives as per history of present illness. Remainder of 10 point review of systems is negative.    Objective     PHYSICAL EXAM     Physical Exam   Vitals:  Most Recent:  Vitals:    04/18/25 1000   BP: 164/81   Pulse: 70   Resp: 20   Temp: 36.6 °C (97.9 °F)   SpO2: 95%   General: Alert, oriented x3, mild right-sided weakness  HEENT: Normocephalic, atraumatic; no facial droop  Neck: Supple, no JVD or lymphadenopathy  CV: Regular rate and rhythm, no murmurs/rubs/gallops  Resp: Clear to auscultation bilaterally  GI: Soft, nontender, normoactive bowel sounds  : No suprapubic tenderness, no CVA tenderness  MSK: No deformities or tenderness; right leg weakness  Neuro:   Cranial Nerves: II-XII grossly intact   Motor: 4-/5 in right leg, 4+/5 in right arm; 5/5 in left extremities   Sensory: Mildly decreased on right   Coordination: Limb ataxia on right   Gait: Deferred 2/2 right leg weakness/unable to bear weight  Skin: Warm, dry, no lesions  Psych: Appropriate mood and affect    Scheduled Medications:   Scheduled Medications[6]     Continuous Medications:   Continuous Medications[7]     PRN Medications:   PRN Medications[8]    24hr Min/Max:  Temp  Min: 36.5 °C (97.7 °F)  Max: 37.3 °C (99.1 °F)  Pulse  Min: 66  Max: 76  BP  Min: 156/110  Max: 206/93  Resp  Min: 14  Max: 20  SpO2  Min: 95 %  Max: 98 %    Hemodynamic parameters for last 24 hours:       No intake or output data in the 24 hours ending 04/18/25 1053    Lab/Radiology/Diagnostic Review:  Results for orders placed or performed during the hospital  encounter of 04/18/25 (from the past 24 hours)   CBC and Auto Differential   Result Value Ref Range    WBC 4.6 4.4 - 11.3 x10*3/uL    nRBC 0.0 0.0 - 0.0 /100 WBCs    RBC 4.89 4.50 - 5.90 x10*6/uL    Hemoglobin 15.2 13.5 - 17.5 g/dL    Hematocrit 45.2 41.0 - 52.0 %    MCV 92 80 - 100 fL    MCH 31.1 26.0 - 34.0 pg    MCHC 33.6 32.0 - 36.0 g/dL    RDW 12.9 11.5 - 14.5 %    Platelets 207 150 - 450 x10*3/uL    Neutrophils % 65.1 40.0 - 80.0 %    Immature Granulocytes %, Automated 0.2 0.0 - 0.9 %    Lymphocytes % 24.6 13.0 - 44.0 %    Monocytes % 7.2 2.0 - 10.0 %    Eosinophils % 2.2 0.0 - 6.0 %    Basophils % 0.7 0.0 - 2.0 %    Neutrophils Absolute 3.00 1.20 - 7.70 x10*3/uL    Immature Granulocytes Absolute, Automated 0.01 0.00 - 0.70 x10*3/uL    Lymphocytes Absolute 1.13 (L) 1.20 - 4.80 x10*3/uL    Monocytes Absolute 0.33 0.10 - 1.00 x10*3/uL    Eosinophils Absolute 0.10 0.00 - 0.70 x10*3/uL    Basophils Absolute 0.03 0.00 - 0.10 x10*3/uL   Comprehensive metabolic panel   Result Value Ref Range    Glucose 100 (H) 74 - 99 mg/dL    Sodium 134 (L) 136 - 145 mmol/L    Potassium 5.7 (H) 3.5 - 5.3 mmol/L    Chloride 105 98 - 107 mmol/L    Bicarbonate 21 21 - 32 mmol/L    Anion Gap 14 10 - 20 mmol/L    Urea Nitrogen 12 6 - 23 mg/dL    Creatinine 0.83 0.50 - 1.30 mg/dL    eGFR >90 >60 mL/min/1.73m*2    Calcium 9.2 8.6 - 10.3 mg/dL    Albumin 4.7 3.4 - 5.0 g/dL    Alkaline Phosphatase 77 33 - 136 U/L    Total Protein 7.3 6.4 - 8.2 g/dL    AST 47 (H) 9 - 39 U/L    Bilirubin, Total 0.7 0.0 - 1.2 mg/dL    ALT 21 10 - 52 U/L   Troponin I, High Sensitivity   Result Value Ref Range    Troponin I, High Sensitivity 4 0 - 20 ng/L   ECG 12 lead   Result Value Ref Range    Ventricular Rate 64 BPM    Atrial Rate 64 BPM    MI Interval 160 ms    QRS Duration 112 ms    QT Interval 414 ms    QTC Calculation(Bazett) 427 ms    P Axis 48 degrees    R Axis -38 degrees    T Axis -1 degrees    QRS Count 11 beats    Q Onset 207 ms    P Onset 127 ms     P Offset 184 ms    T Offset 414 ms    QTC Fredericia 422 ms   Protime-INR   Result Value Ref Range    Protime 10.4 9.8 - 12.4 seconds    INR 0.9 0.9 - 1.1   APTT   Result Value Ref Range    aPTT 30 26 - 36 seconds   POCT GLUCOSE   Result Value Ref Range    POCT Glucose 123 (H) 74 - 99 mg/dL     Imaging  XR chest 1 view  Result Date: 4/18/2025  No acute cardiopulmonary process. Signed by Abdoulaye Malloy MD    CT brain attack head wo IV contrast  Result Date: 4/18/2025  Age-related atrophy and mild small-vessel ischemic changes of the cerebral white matter.   Chronic appearing lacunar infarcts are identified in the right caudate head and left left internal capsule.   No CT evidence of acute intracranial hemorrhage or mass effect.     MACRO: None   Signed by: Rubén Thapa 4/18/2025 8:55 AM Dictation workstation:   [id number]      Cardiology, Vascular, and Other Imaging  ECG 12 lead  Result Date: 4/18/2025  Sinus rhythm with occasional Premature ventricular complexes Left axis deviation Abnormal ECG When compared with ECG of 25-NOV-2024 12:50, Premature ventricular complexes are now Present         Additional Labs:  Lab Results   Component Value Date    WBC 4.6 04/18/2025    WBC 6.5 11/25/2024     04/18/2025     11/25/2024     (L) 04/18/2025     (L) 11/25/2024    K 5.7 (H) 04/18/2025    K 3.6 11/25/2024    BUN 12 04/18/2025    BUN 8 11/25/2024    CREATININE 0.83 04/18/2025    CREATININE 0.69 11/25/2024    MG 1.67 11/25/2024        Assessment   Assessment & Plan  Cerebrovascular accident (CVA), unspecified mechanism (Multi)      ASSESSMENT   59-year-old right-handed male with past medical history of benign prostatic hyperplasia (BPH) and vertigo presented to the ED this morning after awakening around 7 AM with gradual onset of right-sided arm and leg weakness, accompanied by numbness. He denied any similar symptoms the night prior. No associated aphasia, dysarthria, facial droop,  neglect, or visual symptoms. Weakness was persistent upon ED arrival, though he noted some improvement over time, particularly in the right arm. However, he remained unable to bear weight on the right leg. He was evaluated urgently for stroke. Telestroke neurologist was consulted. NIH Stroke Scale score was 2 for limb ataxia and mild sensory loss. CT head showed no evidence of acute hemorrhage or mass effect. Chronic lacunar infarcts were noted. He was deemed not a candidate for mechanical thrombectomy and received TNK at 9:15 AM. Admitted to ICU for post-thrombolysis care.    Plan    PLAN     Neuro:   # Acute ischemic stroke (right hemiparesis leg> arm)  # History of vertigo/BPPV  # EtOH use disorder  -Presented with right hemibody weakness and numbness, persistent right leg weakness  -CT negative for hemorrhage, chronic ischemic changes present  -Not a thrombectomy candidate per teleneuro  - S/p TNK 20 mg at 9:15 AM  - Patient drinks 4 beers daily   Plan:  -Admit to ICU for post-TNK monitoring x24h  -Hourly neurochecks x24h  -Repeat non-contrast head CT at 24h or sooner if neuro worsening  -Neurology consulted and on board  - Completion of stroke workup with MRI brain, MRA head and neck tomorrow morning, echocardiogram with bubble study, lipid panel, TSH/T4, A1c and U-Tox.  - Will start aspirin 81 mg daily after 24 hours from TNK  - Lipid goals with healthy diet and statin therapy to achieve goal LDL-cholesterol<70 mg.  - Blood pressure goals: avoid hypotension SBP <100 that could worsen cerebral perfusion. Ischemic stroke post-thrombolysis- BP < 180/105 mmHg for 24hr..  - Will initiate atorvastatin 40 mg daily.  -EtOH cessation counseling  -NPO x24h post-TNK (or until passing swallow evaluation), then advance as tolerated  -DVT prophylaxis: mechanical only x24h (then reassess for chemical prophylaxis)  -Hold antiplatelets/anticoagulants x24h  -CIWA protocol  -[facility] ICU    Cardiac:   # Hypertension  # PVCs on  EKG  -/98 on admission  -No ACS signs/symptoms  -NSR with PVCs and L axis deviation   Plan:  -Maintain SBP < 180 for 24h post-thrombolysis  -Labetalol IV PRN for SBP > 180  -Continuous telemetry monitoring  -Consider outpatient Holter monitor  -Check troponin x2 to rule out silent ischemia  -Monitor hemodynamics, maintain PIV lines  -Maintain Goal MAP > 65  -Echocardiogram pending  -Monitor and optimize electrolytes, keep K > 4.0, Mag > 2.0    Pulmonary:   -No active conditions  -No distress, SpO2 98% on RA  - Lungs CTA BL  -Continuous pulse oximetry   -O2 PRN to maintain SpO2 > 92%, wean as tolerated       Gastrointestinal:   -No active conditions other than EtOH consumption  - AST 47, likely from EtOH use  -Diet: N.p.o. post thrombolysis/until passing swallow eval  -Supplementation: Will add thiamine, folate, multivitamin  - Will place on CIWA protocol  -Prophylaxis: protonix  -Bowel regimen: MiraLAX    Renal:   # Mild hyperkalemia  # Mild hyponatremia  - Potassium 5.7, sodium 134 on admission  - No EKG changes suggestive of hyperkalemia  - Will continue cardiac monitoring  -Strict I&O's  -Daily RFP,Mg,Phos  -Electrolytes: Replete per protocol, goal K>4 Phos >3 Mg >2  Net IO Since Admission: No IO data has been entered for this period [04/18/25 1053]  Results from last 72 hours   Lab Units 04/18/25  0908   BUN mg/dL 12   CREATININE mg/dL 0.83         Endocrine:   -No active issues  -Monitor blood glucose, screening for diabetes with A1c  -Goal BS < 180    Results from last 7 days   Lab Units 04/18/25  0926 04/18/25  0908   POCT GLUCOSE mg/dL 123*  --    GLUCOSE mg/dL  --  100*        Hematology:   # Post thrombolysis status  - No current signs of bleeding, will monitor  - Holding anticoagulation x 24 hours  -Daily CBC, coags  -DVT PPx within 24 hours/after MRI confirming no bleeding    Results from last 7 days   Lab Units 04/18/25  0908   HEMOGLOBIN g/dL 15.2   HEMATOCRIT % 45.2   PLATELETS AUTO x10*3/uL 207        Infectious Disease:   -No active conditions or concerns  -Vital signs regularly  - Daily CBC  Results from last 7 days   Lab Units 25  0908   WBC AUTO x10*3/uL 4.6     Temp (24hrs), Av.8 °C (98.2 °F), Min:36.5 °C (97.7 °F), Max:37.3 °C (99.1 °F)       Musculoskeletal:   -No active conditions  -PT/OT to evaluate considering right-sided weakness      Lines/Tubes/Drains:   PIV's    Code status: Full Code     Dispo: Patient to remain in ICU for post thrombolysis monitoring, anticipate downgrade to floor tomorrow if stable and pending further MRI/MRA/echocardiogram.     Patient discussed with attending physician.     Regina Marie MD     This note has been transcribed using Dragon voice recognition system and there is a possibility of unintentional typing misprints.  Any information found to be copied from previous providers is done in the best interest of the patient to provide accurate, quality, and continuity of care.    Attending Addendum:    I saw and evaluated the patient. I personally obtained the key and critical portions of the history and physical exam or was physically present for key and critical portions performed by the resident/fellow. I reviewed the resident/fellow's documentation and discussed the patient with the resident/fellow. I agree with the resident/fellow's medical decision making as documented in the note.     Critical care time is 35 minutes.         [1] No past medical history on file.  [2] No past surgical history on file.  [3]   Medications Prior to Admission   Medication Sig Dispense Refill Last Dose/Taking    meclizine (Antivert) 25 mg tablet Take 1 tablet (25 mg) by mouth 3 times a day as needed for dizziness. 30 tablet 0     psyllium (Metamucil) 0.4 gram capsule Take 1 capsule by mouth once daily.      [4]   Social History  Tobacco Use    Smoking status: Former     Types: Cigarettes    Smokeless tobacco: Never   Substance Use Topics    Alcohol use: Not Currently      Alcohol/week: 6.0 standard drinks of alcohol     Types: 6 Cans of beer per week    Drug use: Not Currently     Types: Marijuana   [5] No family history on file.  [6] atorvastatin, 40 mg, oral, Nightly  perflutren lipid microspheres, 0.5-10 mL of dilution, intravenous, Once in imaging  perflutren protein A microsphere, 0.5 mL, intravenous, Once in imaging  sulfur hexafluoride microsphr, 2 mL, intravenous, Once in imaging  [7]    [8] PRN medications: hydrALAZINE, hydrALAZINE, labetaloL, labetaloL, oxygen

## 2025-04-18 NOTE — CONSULTS
"Inpatient consult to Neuro TeleStroke  Consult performed by: Jaleel Siddiqi MD  Consult ordered by: Carlos Rosario DO        Virtual Visit start time: 8:51    History Of Present Illness:  Historian: Patient  Donnie Nogueira is a 65 y.o. male with HTN presenting with R sided weakness.    Patient woke up at 5am at his normal state of health then around 7am suddenly felt his right leg go numb, he tried to get up out of his chair and did not have control over his right leg, then felt the right arm become heavy as well. EMS were called and he came in as a BAT.     In the ED his BP was initially 206/93, and ED confirmed significant right hemiparesis. This improved after he came back from CT but not back to baseline    Last known well: 07:00  Had stroke symptoms resolved at time of presentation: No   Current antiplatelet/anticoagulant use: None    Prior Functional Status (Modified Hector Scale):  0 The patient has no residual symptoms.    Stroke Risk Factors:  Hypertension    Last Recorded Vitals:  Blood pressure (!) 188/98, pulse 71, temperature 37.3 °C (99.1 °F), temperature source Temporal, resp. rate 14, height 1.753 m (5' 9\"), weight 80 kg (176 lb 5.9 oz), SpO2 98%.    NIHSS: 1  Level of Consciousness: 0=alert      LOC questions: 0=answers both questions      LOC commands: 0=performs both  Best Gaze: 0=normal  Visual: 0=normal  Facial Palsy: 0=normal  Motor:      Motor Arm (Left): 0=normal      Motor Arm (Right): 0=normal      Motor Leg (Left): 0=left leg normal      Motor Leg (Right): 1=right leg drift  Limb Ataxia: 0=absent  Sensory: 0=normal  Best Language: 0=no aphasia  Dysarthria: 0=normal  Extinction and Inattention: 0=no abnormality    On further examination. He had minor right hand weakness with decreased dexterity.     Relevant Results:  LABS:  No results found for: \"GLUCOSE\", \"INR\"   No results found for this or any previous visit (from the past 24 hours).     CT Head Imaging:  [facility] imaging personally " reviewed, showed no acute ischemic / hemorrhagic changes     CTA Head and Neck Imaging:  CTA imaging not performed       Assessment:  Supect Acute Ischemic Stroke    IV Thrombolysis IV Thrombolysis Checklist        IV Thrombolysis Given: Yes Thrombolysis Administration: administration time 9:10 Patient meets inclusion and exclusion criteria with expected benefits exceeding the risks of IV thrombolysis therapy or withholding therapy. Patient/ family understand potential risks & benefits and consent to IV Thrombolysis. Discussed the diagnosis of suspected ischemic stroke and options for treatment, including alternative treatments. For patients meeting inclusion and exclusion criteria, the expected benefits exceed the risks of IV thrombolysis therapy or withholding therapy. The main risk of IV thrombolysis therapy is bleeding into the brain or body that may require blood transfusions or lead to death. In clinical studies, the rate of death was not higher in patients who received IV thrombolysis compared to those who did not. Other risks include allergic reactions, and with any procedure there is always the possibility of an unexpected complication.  Patient is <18 - refer [facility] [place] for Emergency Management of pediatric patients with Acute suspected Stroke & the Pediatric IV Thrombolysis Consent. Consent is completed on a paper document and if criteria is met/ benefit outweigh the risk the thrombolytic Alteplase should be given.  :99}Were there delays to thrombolysis administration?: no          Patient is a candidate for thrombectomy:  yes/no: No; contraindication reason: NIHSS <6    Assessment:  65y old man with HTN not on any medication presents with right hemiparesis. CTH shows no hemorrhage or early ischemic changes, and presentation consistent with acute stroke. BP initially high but came down with Labetalol and eventually given TNK at 9:10    Additional Recommendations:  MRI Brain w/o contrast stroke protocol or  repeat CTH 24 hours after initial CTH if unable to get MRI.  CTA head and neck or MRA head and neck to evaluate for cerebral vasculature   EKG, troponin.  TTE w/ bubble study.  Please obtain extended cardiac rhythm monitoring with Holter to rule out paroxysmal A fib.  Lipid panel, A1c.  Utox.  Hold off all antiplatelets and anticoagulant medications for 24 hrs after thrombolysis. Repeat CT Head 24 hrs after TNK administration. If CTH negative for hemorrhagic conversion, consider starting Aspirin 81 mg.  ASA 81mg daily after 24h from TNK  Lipid Goals: education on healthy diet and statin therapy to maintain or achieve goal LDL-cholesterol < 70mg. Atorvastatin 40mg..  Blood pressure goals: avoid hypotension SBP <100 that could worsen cerebral perfusion. Ischemic stroke post-thrombolysis- BP < 180/105 mmHg for 24hr..  Glucose Goals: early treatment of hyperglycemia to goal glucose 140-180 mg/dl with long-term goal A1c < 7%.  NPO until nurse bedside swallow assessment.  Consider focused stroke order set.  Smoking Cessation and Education.  Assessment for Rehabilitation needs.  Patient and family education on signs and symptoms of stroke, calling 911, healthy strategies for stroke prevention.      Jaleel Siddiqi MD  Vascular Neurology Fellow, PGY5  [facility]    Disposition:  Patient will remain at referring facility for further evaluation and management.    Virtual or Telephone Consent    An interactive audio and video telecommunication system which permits real time communications between the patient (at the originating site) and provider (at the distant site) was utilized to provide this telehealth service.   Verbal consent was requested and obtained from Donnie Nogueira on this date, 04/18/25 for a telehealth visit.      Telestroke is covered in shift work. If there are further Neurological questions or concerns please contact your regional neurologist on call during daytime hours or contact the transfer center with an  ADT20 order.    Jaleel Siddiqi MD

## 2025-04-18 NOTE — PROGRESS NOTES
PHARMACY STROKE RESPONSE      Patient Name: Donnie Nogueira  MRN: [id number]  Location: [place] [address]    Patient Weight (kg):   Wt Readings from Last 1 Encounters:   04/18/25 80 kg (176 lb 5.9 oz)        An acute Brain Attack has been activated, pharmacy participated in multidisciplinary team bedside response for Donnie Nogueira.  Contraindications for fibrinolytic therapy have been reviewed by pharmacy and any issues relating to medication therapy have been discussed directly with the provider(s) caring for this patient.     Pharmacy aided in the preparation for fibrinolytic therapy. Patient did fibrinolysis. Tenecteplase (TNKase) Dose administered:  20 mg.    Orders Placed This Encounter      hydrALAZINE (Apresoline) injection 10 mg      hydrALAZINE (Apresoline) injection 10 mg      labetaloL (Normodyne,Trandate) injection 10 mg      labetaloL (Normodyne,Trandate) injection 10 mg      labetaloL (Normodyne,Trandate) injection 20 mg      tenecteplase (TNKASE) injection for STROKE 20 mg      Thank you for allowing me to take part in the care of this patient.     Gregoria Perry  4/18/2025  9:16 AM         References:    Neurological [place] Stroke Tools   Neurological [place] IV Thrombolysis Checklist

## 2025-04-19 ENCOUNTER — APPOINTMENT (OUTPATIENT)
Dept: RADIOLOGY | Facility: HOSPITAL | Age: 65
DRG: 062 | End: 2025-04-19
Payer: MEDICARE

## 2025-04-19 LAB
ALBUMIN SERPL BCP-MCNC: 4.1 G/DL (ref 3.4–5)
ANION GAP SERPL CALC-SCNC: 13 MMOL/L (ref 10–20)
BUN SERPL-MCNC: 9 MG/DL (ref 6–23)
CALCIUM SERPL-MCNC: 9 MG/DL (ref 8.6–10.3)
CHLORIDE SERPL-SCNC: 106 MMOL/L (ref 98–107)
CO2 SERPL-SCNC: 24 MMOL/L (ref 21–32)
CREAT SERPL-MCNC: 0.81 MG/DL (ref 0.5–1.3)
EGFRCR SERPLBLD CKD-EPI 2021: >90 ML/MIN/1.73M*2
ERYTHROCYTE [DISTWIDTH] IN BLOOD BY AUTOMATED COUNT: 13.2 % (ref 11.5–14.5)
GLUCOSE BLD MANUAL STRIP-MCNC: 120 MG/DL (ref 74–99)
GLUCOSE BLD MANUAL STRIP-MCNC: 124 MG/DL (ref 74–99)
GLUCOSE BLD MANUAL STRIP-MCNC: 126 MG/DL (ref 74–99)
GLUCOSE BLD MANUAL STRIP-MCNC: 127 MG/DL (ref 74–99)
GLUCOSE BLD MANUAL STRIP-MCNC: 130 MG/DL (ref 74–99)
GLUCOSE BLD MANUAL STRIP-MCNC: 144 MG/DL (ref 74–99)
GLUCOSE SERPL-MCNC: 104 MG/DL (ref 74–99)
HCT VFR BLD AUTO: 39.9 % (ref 41–52)
HGB BLD-MCNC: 13.6 G/DL (ref 13.5–17.5)
MAGNESIUM SERPL-MCNC: 2.12 MG/DL (ref 1.6–2.4)
MCH RBC QN AUTO: 31.1 PG (ref 26–34)
MCHC RBC AUTO-ENTMCNC: 34.1 G/DL (ref 32–36)
MCV RBC AUTO: 91 FL (ref 80–100)
NRBC BLD-RTO: 0 /100 WBCS (ref 0–0)
PHOSPHATE SERPL-MCNC: 2.5 MG/DL (ref 2.5–4.9)
PLATELET # BLD AUTO: 222 X10*3/UL (ref 150–450)
POTASSIUM SERPL-SCNC: 3.8 MMOL/L (ref 3.5–5.3)
RBC # BLD AUTO: 4.38 X10*6/UL (ref 4.5–5.9)
SODIUM SERPL-SCNC: 139 MMOL/L (ref 136–145)
TSH SERPL-ACNC: 2.76 MIU/L (ref 0.44–3.98)
WBC # BLD AUTO: 4.9 X10*3/UL (ref 4.4–11.3)

## 2025-04-19 PROCEDURE — 80069 RENAL FUNCTION PANEL: CPT

## 2025-04-19 PROCEDURE — 82947 ASSAY GLUCOSE BLOOD QUANT: CPT

## 2025-04-19 PROCEDURE — 2500000001 HC RX 250 WO HCPCS SELF ADMINISTERED DRUGS (ALT 637 FOR MEDICARE OP)

## 2025-04-19 PROCEDURE — 70547 MR ANGIOGRAPHY NECK W/O DYE: CPT

## 2025-04-19 PROCEDURE — 83735 ASSAY OF MAGNESIUM: CPT

## 2025-04-19 PROCEDURE — 97162 PT EVAL MOD COMPLEX 30 MIN: CPT | Mod: GP

## 2025-04-19 PROCEDURE — 70544 MR ANGIOGRAPHY HEAD W/O DYE: CPT

## 2025-04-19 PROCEDURE — 70547 MR ANGIOGRAPHY NECK W/O DYE: CPT | Performed by: RADIOLOGY

## 2025-04-19 PROCEDURE — 70551 MRI BRAIN STEM W/O DYE: CPT | Performed by: RADIOLOGY

## 2025-04-19 PROCEDURE — 70200 X-RAY EXAM OF EYE SOCKETS: CPT | Performed by: RADIOLOGY

## 2025-04-19 PROCEDURE — 1200000002 HC GENERAL ROOM WITH TELEMETRY DAILY

## 2025-04-19 PROCEDURE — 2500000004 HC RX 250 GENERAL PHARMACY W/ HCPCS (ALT 636 FOR OP/ED): Mod: JZ | Performed by: STUDENT IN AN ORGANIZED HEALTH CARE EDUCATION/TRAINING PROGRAM

## 2025-04-19 PROCEDURE — 97530 THERAPEUTIC ACTIVITIES: CPT | Mod: GP

## 2025-04-19 PROCEDURE — 99233 SBSQ HOSP IP/OBS HIGH 50: CPT | Performed by: STUDENT IN AN ORGANIZED HEALTH CARE EDUCATION/TRAINING PROGRAM

## 2025-04-19 PROCEDURE — 84443 ASSAY THYROID STIM HORMONE: CPT

## 2025-04-19 PROCEDURE — 70551 MRI BRAIN STEM W/O DYE: CPT

## 2025-04-19 PROCEDURE — 2500000001 HC RX 250 WO HCPCS SELF ADMINISTERED DRUGS (ALT 637 FOR MEDICARE OP): Performed by: STUDENT IN AN ORGANIZED HEALTH CARE EDUCATION/TRAINING PROGRAM

## 2025-04-19 PROCEDURE — 97161 PT EVAL LOW COMPLEX 20 MIN: CPT | Mod: GP

## 2025-04-19 PROCEDURE — 2500000004 HC RX 250 GENERAL PHARMACY W/ HCPCS (ALT 636 FOR OP/ED)

## 2025-04-19 PROCEDURE — 85027 COMPLETE CBC AUTOMATED: CPT

## 2025-04-19 PROCEDURE — 70544 MR ANGIOGRAPHY HEAD W/O DYE: CPT | Performed by: RADIOLOGY

## 2025-04-19 PROCEDURE — 97165 OT EVAL LOW COMPLEX 30 MIN: CPT | Mod: GO | Performed by: OCCUPATIONAL THERAPIST

## 2025-04-19 PROCEDURE — 36415 COLL VENOUS BLD VENIPUNCTURE: CPT

## 2025-04-19 PROCEDURE — 70200 X-RAY EXAM OF EYE SOCKETS: CPT

## 2025-04-19 RX ORDER — ATORVASTATIN CALCIUM 80 MG/1
80 TABLET, FILM COATED ORAL NIGHTLY
Status: DISCONTINUED | OUTPATIENT
Start: 2025-04-19 | End: 2025-04-20 | Stop reason: HOSPADM

## 2025-04-19 RX ORDER — LISINOPRIL 5 MG/1
5 TABLET ORAL DAILY
Status: DISCONTINUED | OUTPATIENT
Start: 2025-04-19 | End: 2025-04-20 | Stop reason: HOSPADM

## 2025-04-19 RX ORDER — ASPIRIN 81 MG/1
81 TABLET ORAL DAILY
Status: DISCONTINUED | OUTPATIENT
Start: 2025-04-19 | End: 2025-04-20 | Stop reason: HOSPADM

## 2025-04-19 RX ADMIN — FOLIC ACID 1 MG: 1 TABLET ORAL at 08:14

## 2025-04-19 RX ADMIN — HYDRALAZINE HYDROCHLORIDE 10 MG: 20 INJECTION INTRAMUSCULAR; INTRAVENOUS at 07:23

## 2025-04-19 RX ADMIN — ATORVASTATIN CALCIUM 80 MG: 80 TABLET, FILM COATED ORAL at 20:48

## 2025-04-19 RX ADMIN — LISINOPRIL 5 MG: 2.5 TABLET ORAL at 11:39

## 2025-04-19 RX ADMIN — Medication 1 TABLET: at 08:14

## 2025-04-19 RX ADMIN — ASPIRIN 81 MG: 81 TABLET, COATED ORAL at 17:20

## 2025-04-19 RX ADMIN — HYDRALAZINE HYDROCHLORIDE 10 MG: 20 INJECTION INTRAMUSCULAR; INTRAVENOUS at 07:43

## 2025-04-19 RX ADMIN — THIAMINE HCL TAB 100 MG 100 MG: 100 TAB at 08:14

## 2025-04-19 RX ADMIN — LABETALOL HYDROCHLORIDE 10 MG: 5 INJECTION, SOLUTION INTRAVENOUS at 10:53

## 2025-04-19 ASSESSMENT — LIFESTYLE VARIABLES
AUDITORY DISTURBANCES: NOT PRESENT
AGITATION: NORMAL ACTIVITY
PAROXYSMAL SWEATS: NO SWEAT VISIBLE
VISUAL DISTURBANCES: NOT PRESENT
HEADACHE, FULLNESS IN HEAD: NOT PRESENT
ORIENTATION AND CLOUDING OF SENSORIUM: ORIENTED AND CAN DO SERIAL ADDITIONS
AUDITORY DISTURBANCES: NOT PRESENT
NAUSEA AND VOMITING: NO NAUSEA AND NO VOMITING
AGITATION: NORMAL ACTIVITY
TOTAL SCORE: 0
ORIENTATION AND CLOUDING OF SENSORIUM: ORIENTED AND CAN DO SERIAL ADDITIONS
PAROXYSMAL SWEATS: NO SWEAT VISIBLE
AUDITORY DISTURBANCES: NOT PRESENT
VISUAL DISTURBANCES: NOT PRESENT
PAROXYSMAL SWEATS: NO SWEAT VISIBLE
TREMOR: NO TREMOR
ORIENTATION AND CLOUDING OF SENSORIUM: ORIENTED AND CAN DO SERIAL ADDITIONS
ANXIETY: NO ANXIETY, AT EASE
TREMOR: NO TREMOR
NAUSEA AND VOMITING: NO NAUSEA AND NO VOMITING
ORIENTATION AND CLOUDING OF SENSORIUM: ORIENTED AND CAN DO SERIAL ADDITIONS
HEADACHE, FULLNESS IN HEAD: NOT PRESENT
TOTAL SCORE: 0
AGITATION: NORMAL ACTIVITY
AUDITORY DISTURBANCES: NOT PRESENT
AGITATION: NORMAL ACTIVITY
ANXIETY: NO ANXIETY, AT EASE
ANXIETY: NO ANXIETY, AT EASE
AUDITORY DISTURBANCES: NOT PRESENT
HEADACHE, FULLNESS IN HEAD: NOT PRESENT
AGITATION: NORMAL ACTIVITY
PAROXYSMAL SWEATS: NO SWEAT VISIBLE
HEADACHE, FULLNESS IN HEAD: NOT PRESENT
ANXIETY: NO ANXIETY, AT EASE
TREMOR: NO TREMOR
TOTAL SCORE: 0
TOTAL SCORE: 0
HEADACHE, FULLNESS IN HEAD: NOT PRESENT
ORIENTATION AND CLOUDING OF SENSORIUM: ORIENTED AND CAN DO SERIAL ADDITIONS
AGITATION: NORMAL ACTIVITY
AUDITORY DISTURBANCES: NOT PRESENT
NAUSEA AND VOMITING: NO NAUSEA AND NO VOMITING
AUDITORY DISTURBANCES: NOT PRESENT
VISUAL DISTURBANCES: NOT PRESENT
ANXIETY: NO ANXIETY, AT EASE
ANXIETY: NO ANXIETY, AT EASE
AGITATION: NORMAL ACTIVITY
TOTAL SCORE: 0
TOTAL SCORE: 0
PAROXYSMAL SWEATS: NO SWEAT VISIBLE
TREMOR: NO TREMOR
ORIENTATION AND CLOUDING OF SENSORIUM: ORIENTED AND CAN DO SERIAL ADDITIONS
ORIENTATION AND CLOUDING OF SENSORIUM: ORIENTED AND CAN DO SERIAL ADDITIONS
PAROXYSMAL SWEATS: NO SWEAT VISIBLE
AUDITORY DISTURBANCES: NOT PRESENT
ANXIETY: NO ANXIETY, AT EASE
TREMOR: NO TREMOR
NAUSEA AND VOMITING: NO NAUSEA AND NO VOMITING
AGITATION: NORMAL ACTIVITY
HEADACHE, FULLNESS IN HEAD: NOT PRESENT
ORIENTATION AND CLOUDING OF SENSORIUM: ORIENTED AND CAN DO SERIAL ADDITIONS
TREMOR: NO TREMOR
ANXIETY: NO ANXIETY, AT EASE
NAUSEA AND VOMITING: NO NAUSEA AND NO VOMITING
PAROXYSMAL SWEATS: NO SWEAT VISIBLE
TOTAL SCORE: 0
HEADACHE, FULLNESS IN HEAD: NOT PRESENT
TREMOR: NO TREMOR
VISUAL DISTURBANCES: NOT PRESENT
NAUSEA AND VOMITING: NO NAUSEA AND NO VOMITING
VISUAL DISTURBANCES: NOT PRESENT
PAROXYSMAL SWEATS: NO SWEAT VISIBLE
VISUAL DISTURBANCES: NOT PRESENT
TREMOR: NO TREMOR
BLOOD PRESSURE: 127/89
VISUAL DISTURBANCES: NOT PRESENT
HEADACHE, FULLNESS IN HEAD: NOT PRESENT
VISUAL DISTURBANCES: NOT PRESENT
TOTAL SCORE: 0

## 2025-04-19 ASSESSMENT — PAIN - FUNCTIONAL ASSESSMENT
PAIN_FUNCTIONAL_ASSESSMENT: 0-10

## 2025-04-19 ASSESSMENT — COGNITIVE AND FUNCTIONAL STATUS - GENERAL
MOVING TO AND FROM BED TO CHAIR: A LOT
DRESSING REGULAR LOWER BODY CLOTHING: A LITTLE
DRESSING REGULAR LOWER BODY CLOTHING: A LITTLE
CLIMB 3 TO 5 STEPS WITH RAILING: TOTAL
CLIMB 3 TO 5 STEPS WITH RAILING: TOTAL
EATING MEALS: A LITTLE
TOILETING: A LOT
TURNING FROM BACK TO SIDE WHILE IN FLAT BAD: A LITTLE
STANDING UP FROM CHAIR USING ARMS: A LITTLE
DAILY ACTIVITIY SCORE: 17
TURNING FROM BACK TO SIDE WHILE IN FLAT BAD: A LITTLE
PERSONAL GROOMING: A LITTLE
TOILETING: A LOT
WALKING IN HOSPITAL ROOM: A LOT
MOBILITY SCORE: 14
PERSONAL GROOMING: A LITTLE
TURNING FROM BACK TO SIDE WHILE IN FLAT BAD: A LITTLE
WALKING IN HOSPITAL ROOM: A LOT
DRESSING REGULAR UPPER BODY CLOTHING: A LITTLE
HELP NEEDED FOR BATHING: A LITTLE
EATING MEALS: A LITTLE
WALKING IN HOSPITAL ROOM: A LOT
DAILY ACTIVITIY SCORE: 15
MOVING FROM LYING ON BACK TO SITTING ON SIDE OF FLAT BED WITH BEDRAILS: A LITTLE
MOVING TO AND FROM BED TO CHAIR: A LOT
TOILETING: A LOT
MOBILITY SCORE: 13
DRESSING REGULAR UPPER BODY CLOTHING: A LITTLE
MOVING TO AND FROM BED TO CHAIR: A LOT
DRESSING REGULAR LOWER BODY CLOTHING: A LOT
STANDING UP FROM CHAIR USING ARMS: A LITTLE
DAILY ACTIVITIY SCORE: 17
STANDING UP FROM CHAIR USING ARMS: A LOT
HELP NEEDED FOR BATHING: A LITTLE
MOVING FROM LYING ON BACK TO SITTING ON SIDE OF FLAT BED WITH BEDRAILS: A LITTLE
HELP NEEDED FOR BATHING: A LOT
MOVING FROM LYING ON BACK TO SITTING ON SIDE OF FLAT BED WITH BEDRAILS: A LITTLE
DRESSING REGULAR UPPER BODY CLOTHING: A LOT
MOBILITY SCORE: 14
CLIMB 3 TO 5 STEPS WITH RAILING: TOTAL
PERSONAL GROOMING: A LITTLE

## 2025-04-19 ASSESSMENT — ACTIVITIES OF DAILY LIVING (ADL)
ADL_ASSISTANCE: INDEPENDENT
ADL_ASSISTANCE: INDEPENDENT

## 2025-04-19 ASSESSMENT — PAIN SCALES - GENERAL
PAINLEVEL_OUTOF10: 0 - NO PAIN

## 2025-04-19 NOTE — PROGRESS NOTES
Occupational Therapy    Evaluation    Patient Name: Donnie Nogueira  MRN: [id number]  Today's Date: 4/19/2025  Time Calculation  Start Time: 1043  Stop Time: 1115  Time Calculation (min): 32 min  2124/2124-A  Eval only     Assessment  IP OT Assessment  Prognosis: Good  End of Session Communication: Bedside nurse  End of Session Patient Position: Bed, 3 rail up, Alarm on (family remained at bedside)  Patient presents with decline in ADLs, functional transfers, and functional mobility tasks and would benefit from OT during acute stay to improve functional independence and safety.  Patient currently requires 24 hour hands on assistance. Recommend high intensity OT to maximize functional independence and safety.       Plan:  Treatment Interventions: ADL retraining, Functional transfer training, Patient/family training, Equipment evaluation/education, Compensatory technique education  OT Frequency: Daily  OT Discharge Recommendations: High intensity level of continued care  OT - OK to Discharge: Yes from acute care OT services to the next level of care when cleared by medical team      Subjective   Current Problem:  1. Cerebrovascular accident (CVA), unspecified mechanism (Multi)  Transthoracic Echo (TTE) Complete    Transthoracic Echo (TTE) Complete    CANCELED: Transthoracic Echo (TTE) Complete    CANCELED: Transthoracic Echo (TTE) Complete      2. Cerebral arteriosclerosis with history of previous stroke  CANCELED: Transthoracic Echo (TTE) Complete    CANCELED: Transthoracic Echo (TTE) Complete      3. Dizziness  Transthoracic Echo (TTE) Complete    Transthoracic Echo (TTE) Complete      4. 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 PMH: vertigo, ETOH, dizziness   "Patient received TNK in ED with NIHSS=2 and BP was 206/93.  Family/Caregiver Present: Yes (spouse arrived during functional assessment)  Co-Treatment: PT  Co-Treatment Reason: for safety  Prior to Session Communication: Bedside nurse  Patient Position Received: Bed, 3 rail up, Alarm on  Preferred Learning Style: verbal  General Comment: Patient in supine in bed and agreeable to participate in OT evaluation.    Precautions:  Medical Precautions: Fall precautions (CIWA)  Precautions Comment: Fluctuating right sided weakness.  Appears to have increased weakness after exertion but appears to resolve when returns to supine after several minute rest break. avoid hypotension SBP <100, Ischemic stroke post-thrombolysis- BP < 180/105    Vital Signs:  BP:  (Initial BP in supine was 196/81.  RN aware and cleared patient to work with therapy.  Patient with seated BP of 178/70. RN at bedside administered medication to help reduce BP.  BP reassessed and was 144/83.)    Pain:  Pain Assessment  Pain Assessment: 0-10  0-10 (Numeric) Pain Score: 0 - No pain (does not report pain but reports right LE \"feels heavy\" after several minutes of activity RN aware)    Objective   Cognition:  Overall Cognitive Status: Within Functional Limits  Safety/Judgement:  (decreased safety awareness)  Insight: Moderate  Impulsive: Moderately    Home Living:  Type of Home: House  Lives With: Spouse  Home Layout: One level  Home Access: Stairs to enter with rails  Entrance Stairs-Number of Steps: 3  Bathroom Shower/Tub: Tub only     Prior Function:  Level of [place]: Independent with ADLs and functional transfers  ADL Assistance: Independent  Ambulatory Assistance: Independent (without AD)  Hand Dominance: Left  Prior Function Comments: denies falls in the past 6 months.    ADL:  LE Dressing Assistance: Maximal    Activity Tolerance:  Endurance: Decreased tolerance for upright activites    Bed Mobility/Transfers:   Bed Mobility  Bed Mobility:  (CGA " supine to sit at edge of bed. Min assist sit to supine.)  Transfers  Transfer:  (mod assist of 2 sit <> stand)    Ambulation/Gait Training:  Functional Mobility  Functional Mobility Performed:  (mod assist of 2 to side step to head of bed. patient with observed flexor synergy right UE when taking steps to head of bed.)    Sensation:  Sensation Comment: Reports numbness right LE and UE. Reported numbness increased in right UE after several minutes of seated and standing activity. RN reports that this has been waxing and waning.    Coordination:  Rapid Alternating Movements: Bradykinesia (right UE)     Extremities: RUE   RUE : Within Functional Limits. Right UE strength grossly 4/5.    LUE: Within Functional Limits. Left UE strength grossly 5/5    Outcome Measures: [facility] Daily Activity  Putting on and taking off regular lower body clothing: A lot  Bathing (including washing, rinsing, drying): A lot  Putting on and taking off regular upper body clothing: A lot  Toileting, which includes using toilet, bedpan or urinal: A lot  Taking care of personal grooming such as brushing teeth: A little  Eating Meals: None  Daily Activity - Total Score: 15      EDUCATION:  Education  Individual(s) Educated: Patient  Education Provided: Fall precautons  Patient Response to Education: Patient/Caregiver Verbalized Understanding of Information    Goals:   Encounter Problems       Encounter Problems (Active)       Dressing Upper Extremities       STG - Patient will dress upper body with CGA (Progressing)       Start:  04/19/25    Expected End:  05/03/25               Dressings Lower Extremities       STG - Patient will complete lower body dressing with CGA with comp strategies and AE (Progressing)       Start:  04/19/25    Expected End:  05/03/25               Functional Balance       STG-Patient will be CGA with assistive device dynamic stand task >5 minutes for ADL completion   (Progressing)       Start:  04/19/25    Expected End:   05/03/25               Functional Mobility       STG-Patient will be min assist with assistive device functional mobility tasks   (Progressing)       Start:  04/19/25    Expected End:  05/03/25               OT Transfers       STG-Patient will be CGA with functional transfers demonstrating good safety   (Progressing)       Start:  04/19/25    Expected End:  05/03/25

## 2025-04-19 NOTE — PROGRESS NOTES
Spiritual Care Visit  Spiritual Care Request    Reason for Visit:        Request Received From:       Focus of Care:            Refer to :          Spiritual Care Assessment    Spiritual Assessment:                      Care Provided:       Sense of Community and or Scientology Affiliation:  Druze         Addressed Needs/Concerns and/or Freddy Through:          Outcome:        Advance Directives:         Spiritual Care Annotation    Annotation:  I visited with patient discussing his health, Orthodoxy analilia and issues that were upsetting to him.  Patient was open to listening to things that he has not thought about for 30 years.

## 2025-04-19 NOTE — CARE PLAN
Problem: General Stroke  Goal: Establish a mutual long term goal with patient by discharge  Outcome: Progressing  Goal: Demonstrate improvement in neurological exam throughout the shift  Outcome: Progressing  Goal: Maintain BP within ordered limits throughout shift  Outcome: Progressing  Goal: Participate in treatment (ie., meds, therapy) throughout shift  Outcome: Progressing  Goal: No symptoms of aspiration throughout shift  Outcome: Progressing  Goal: No symptoms of hemorrhage throughout shift  Outcome: Progressing  Goal: Tolerate enteral feeding throughout shift  Outcome: Progressing  Goal: Decreased nausea/vomiting throughout shift  Outcome: Progressing  Goal: Controlled blood glucose throughout shift  Outcome: Progressing  Goal: Out of bed three times today  Outcome: Progressing     Problem: ICU Stroke  Goal: Maintain patent airway throughout shift  Outcome: Progressing  Goal: Achieve/maintain targeted sodium level throughout shift  Outcome: Progressing     Problem: Pain - Adult  Goal: Verbalizes/displays adequate comfort level or baseline comfort level  Outcome: Progressing     Problem: Safety - Adult  Goal: Free from fall injury  Outcome: Progressing     Problem: Discharge Planning  Goal: Discharge to home or other facility with appropriate resources  Outcome: Progressing     Problem: Chronic Conditions and Co-morbidities  Goal: Patient's chronic conditions and co-morbidity symptoms are monitored and maintained or improved  Outcome: Progressing     Problem: Nutrition  Goal: Nutrient intake appropriate for maintaining nutritional needs  Outcome: Progressing   The patient's goals for the shift include      The clinical goals for the shift include Patient will have no change in neurologic status by end of shift.

## 2025-04-19 NOTE — CARE PLAN
The patient's goals for the shift include      The clinical goals for the shift include Patient will remain hemodynamically stable this shift. Pt hypertensive intermittently this shift and required hydralazine and labetalol PRN this shift prior to starting on low dose Lisinopril.  Pt continues to have a mild R sided facial droop, mild right sided limb ataxia and weakness. No change in neurological exam this shift. MRI completed. PT/OT eval completed. Spouse visited this shift and was updated on plan of care. Pt downgraded from ICU to Acute status. Pt tolerating oral intake w/o s/s aspiration. Will continue to monitor pt condition.

## 2025-04-19 NOTE — PROGRESS NOTES
Critical Care Daily Progress        Subjective      Patient is a 65 y.o. male admitted on 4/18/2025  8:39 AM with the following indication(s) for ICU care: Acute stroke with right hemiparesis s/p TNK which required admission to the ICU for post thrombolysis care.     Major ICU events: None     Overnight Events: NAEO    Complaints: Patient seen and examined at bedside this morning, had uneventful night, no complaints at the time of the evaluation.  BP noted to be elevated, worsening right-sided hemiparesis and mild right facial droop was noted this morning.  Pending MRI/MRA head and neck this morning.  He required orbital x-rays as per radiology considering unclear history of foreign object in the eye.  Neurology on board.  Plan to downgrade to floor pending MRI results.    Input and output: Net IO Since Admission: -1,155 mL [04/19/25 0754]    Intake/Output Summary (Last 24 hours) at 4/19/2025 0754  Last data filed at 4/19/2025 0727  Gross per 24 hour   Intake 440 ml   Output 1595 ml   Net -1155 ml       Last bowel movement: Last BM: Last BM Date: 04/19/25    Drips: Continuous Medications[1]     Labs:  Results from last 7 days   Lab Units 04/19/25  0320 04/18/25  0908   WBC AUTO x10*3/uL 4.9 4.6   HEMOGLOBIN g/dL 13.6 15.2   HEMATOCRIT % 39.9* 45.2   PLATELETS AUTO x10*3/uL 222 207     Results from last 7 days   Lab Units 04/19/25  0320 04/18/25  0908   SODIUM mmol/L 139 134*   POTASSIUM mmol/L 3.8 5.7*   MAGNESIUM mg/dL 2.12  --    PHOSPHORUS mg/dL 2.5  --    BUN mg/dL 9 12   CREATININE mg/dL 0.81 0.83       Results from last 7 days   Lab Units 04/19/25  0350 04/19/25  0320 04/18/25  2349 04/18/25 2013 04/18/25  1548 04/18/25  1136 04/18/25  0926 04/18/25  0908   POCT GLUCOSE mg/dL 120*  --  119* 97 131* 104* 123*  --    GLUCOSE mg/dL  --  104*  --   --   --   --   --  100*   TRIGLYCERIDES mg/dL  --   --   --   --   --   --   --  508*      Results from last 7 days   Lab Units 04/18/25  0921 04/18/25  0908   INR   0.9  --    APTT seconds 30  --    ALK PHOS U/L  --  77   AST U/L  --  47*   ALT U/L  --  21           Scheduled Medications:   Scheduled Medications[2]     Continuous Medications:   Continuous Medications[3]     PRN Medications:   PRN Medications[4]    Objective       Vitals:  Most Recent:  Vitals:    04/19/25 0754   BP: 141/82   Pulse: 100   Resp: 26   Temp:    SpO2:        Physical Exam:   Physical Exam   General: Alert, oriented x3, right-sided weakness worse than yesterday  HEENT: Normocephalic, atraumatic; mild right facial droop  Neck: Supple, no JVD or lymphadenopathy  CV: Regular rate and rhythm, no murmurs/rubs/gallops  Resp: Clear to auscultation bilaterally  GI: Soft, nontender, normoactive bowel sounds  : No suprapubic tenderness, no CVA tenderness  MSK: No deformities or tenderness; right leg weakness  Neuro:   Cranial Nerves: II-XII grossly intact (mild right facial droop)   Motor: 3/5 in right leg, 4+/5 in right arm; 5/5 in left extremities   Sensory: Mildly decreased on right   Coordination: Limb ataxia on right   Gait: Deferred 2/2 right leg weakness/unable to bear weight  Skin: Warm, dry, no lesions  Psych: Appropriate mood and affect  24hr Min/Max:  Temp  Min: 36.3 °C (97.3 °F)  Max: 37.3 °C (99.1 °F)  Pulse  Min: 56  Max: 104  BP  Min: 114/57  Max: 206/93  Resp  Min: 14  Max: 44  SpO2  Min: 90 %  Max: 98 %       I/O:    Intake/Output Summary (Last 24 hours) at 4/19/2025 0754  Last data filed at 4/19/2025 0727  Gross per 24 hour   Intake 440 ml   Output 1595 ml   Net -1155 ml         Lab/Radiology/Diagnostic Review:  Results for orders placed or performed during the hospital encounter of 04/18/25 (from the past 24 hours)   CBC and Auto Differential   Result Value Ref Range    WBC 4.6 4.4 - 11.3 x10*3/uL    nRBC 0.0 0.0 - 0.0 /100 WBCs    RBC 4.89 4.50 - 5.90 x10*6/uL    Hemoglobin 15.2 13.5 - 17.5 g/dL    Hematocrit 45.2 41.0 - 52.0 %    MCV 92 80 - 100 fL    MCH 31.1 26.0 - 34.0 pg    MCHC 33.6  32.0 - 36.0 g/dL    RDW 12.9 11.5 - 14.5 %    Platelets 207 150 - 450 x10*3/uL    Neutrophils % 65.1 40.0 - 80.0 %    Immature Granulocytes %, Automated 0.2 0.0 - 0.9 %    Lymphocytes % 24.6 13.0 - 44.0 %    Monocytes % 7.2 2.0 - 10.0 %    Eosinophils % 2.2 0.0 - 6.0 %    Basophils % 0.7 0.0 - 2.0 %    Neutrophils Absolute 3.00 1.20 - 7.70 x10*3/uL    Immature Granulocytes Absolute, Automated 0.01 0.00 - 0.70 x10*3/uL    Lymphocytes Absolute 1.13 (L) 1.20 - 4.80 x10*3/uL    Monocytes Absolute 0.33 0.10 - 1.00 x10*3/uL    Eosinophils Absolute 0.10 0.00 - 0.70 x10*3/uL    Basophils Absolute 0.03 0.00 - 0.10 x10*3/uL   Comprehensive metabolic panel   Result Value Ref Range    Glucose 100 (H) 74 - 99 mg/dL    Sodium 134 (L) 136 - 145 mmol/L    Potassium 5.7 (H) 3.5 - 5.3 mmol/L    Chloride 105 98 - 107 mmol/L    Bicarbonate 21 21 - 32 mmol/L    Anion Gap 14 10 - 20 mmol/L    Urea Nitrogen 12 6 - 23 mg/dL    Creatinine 0.83 0.50 - 1.30 mg/dL    eGFR >90 >60 mL/min/1.73m*2    Calcium 9.2 8.6 - 10.3 mg/dL    Albumin 4.7 3.4 - 5.0 g/dL    Alkaline Phosphatase 77 33 - 136 U/L    Total Protein 7.3 6.4 - 8.2 g/dL    AST 47 (H) 9 - 39 U/L    Bilirubin, Total 0.7 0.0 - 1.2 mg/dL    ALT 21 10 - 52 U/L   Troponin I, High Sensitivity   Result Value Ref Range    Troponin I, High Sensitivity 4 0 - 20 ng/L   Lavender Top   Result Value Ref Range    Extra Tube Hold for add-ons.    Lipid Panel   Result Value Ref Range    Cholesterol 290 (H) 0 - 199 mg/dL    HDL-Cholesterol 55.1 mg/dL    Cholesterol/HDL Ratio 5.3     LDL Calculated      VLDL      Triglycerides 508 (H) 0 - 149 mg/dL    Non HDL Cholesterol 235 (H) 0 - 149 mg/dL   Hemoglobin A1C   Result Value Ref Range    Hemoglobin A1C 4.8 See comment %    Estimated Average Glucose 91 Not Established mg/dL   B-Type Natriuretic Peptide   Result Value Ref Range    BNP 57 0 - 99 pg/mL   ECG 12 lead   Result Value Ref Range    Ventricular Rate 64 BPM    Atrial Rate 64 BPM    MT Interval 160  ms    QRS Duration 112 ms    QT Interval 414 ms    QTC Calculation(Bazett) 427 ms    P Axis 48 degrees    R Axis -38 degrees    T Axis -1 degrees    QRS Count 11 beats    Q Onset 207 ms    P Onset 127 ms    P Offset 184 ms    T Offset 414 ms    QTC Fredericia 422 ms   Protime-INR   Result Value Ref Range    Protime 10.4 9.8 - 12.4 seconds    INR 0.9 0.9 - 1.1   APTT   Result Value Ref Range    aPTT 30 26 - 36 seconds   POCT GLUCOSE   Result Value Ref Range    POCT Glucose 123 (H) 74 - 99 mg/dL   Urinalysis with Reflex Culture and Microscopic   Result Value Ref Range    Color, Urine Light-Yellow Light-Yellow, Yellow, Dark-Yellow    Appearance, Urine Clear Clear    Specific Gravity, Urine 1.015 1.005 - 1.035    pH, Urine 5.0 5.0, 5.5, 6.0, 6.5, 7.0, 7.5, 8.0    Protein, Urine NEGATIVE NEGATIVE, 10 (TRACE), 20 (TRACE) mg/dL    Glucose, Urine Normal Normal mg/dL    Blood, Urine NEGATIVE NEGATIVE mg/dL    Ketones, Urine NEGATIVE NEGATIVE mg/dL    Bilirubin, Urine NEGATIVE NEGATIVE mg/dL    Urobilinogen, Urine Normal Normal mg/dL    Nitrite, Urine NEGATIVE NEGATIVE    Leukocyte Esterase, Urine NEGATIVE NEGATIVE   Extra Urine Gray Tube   Result Value Ref Range    Extra Tube Hold for add-ons.    Drug Screen, Urine   Result Value Ref Range    Amphetamine Screen, Urine Presumptive Negative Presumptive Negative    Barbiturate Screen, Urine Presumptive Negative Presumptive Negative    Benzodiazepines Screen, Urine Presumptive Negative Presumptive Negative    Cannabinoid Screen, Urine Presumptive Negative Presumptive Negative    Cocaine Metabolite Screen, Urine Presumptive Negative Presumptive Negative    Fentanyl Screen, Urine Presumptive Negative Presumptive Negative    Opiate Screen, Urine Presumptive Negative Presumptive Negative    Oxycodone Screen, Urine Presumptive Negative Presumptive Negative    PCP Screen, Urine Presumptive Negative Presumptive Negative    Methadone Screen, Urine Presumptive Negative Presumptive  Negative   POCT GLUCOSE   Result Value Ref Range    POCT Glucose 104 (H) 74 - 99 mg/dL   Transthoracic Echo (TTE) Complete   Result Value Ref Range    LV Biplane EF 54 %    LVOT diam 2.12 cm    MV E/A ratio 0.56     Tricuspid annular plane systolic excursion 1.8 cm    LV EF 58 %    RV free wall pk S' 12.00 cm/s    RVSP 29.2 mmHg    LVIDd 4.37 cm    LV A4C EF 54.0    POCT GLUCOSE   Result Value Ref Range    POCT Glucose 131 (H) 74 - 99 mg/dL   POCT GLUCOSE   Result Value Ref Range    POCT Glucose 97 74 - 99 mg/dL   POCT GLUCOSE   Result Value Ref Range    POCT Glucose 119 (H) 74 - 99 mg/dL   CBC   Result Value Ref Range    WBC 4.9 4.4 - 11.3 x10*3/uL    nRBC 0.0 0.0 - 0.0 /100 WBCs    RBC 4.38 (L) 4.50 - 5.90 x10*6/uL    Hemoglobin 13.6 13.5 - 17.5 g/dL    Hematocrit 39.9 (L) 41.0 - 52.0 %    MCV 91 80 - 100 fL    MCH 31.1 26.0 - 34.0 pg    MCHC 34.1 32.0 - 36.0 g/dL    RDW 13.2 11.5 - 14.5 %    Platelets 222 150 - 450 x10*3/uL   Magnesium   Result Value Ref Range    Magnesium 2.12 1.60 - 2.40 mg/dL   Renal Function Panel   Result Value Ref Range    Glucose 104 (H) 74 - 99 mg/dL    Sodium 139 136 - 145 mmol/L    Potassium 3.8 3.5 - 5.3 mmol/L    Chloride 106 98 - 107 mmol/L    Bicarbonate 24 21 - 32 mmol/L    Anion Gap 13 10 - 20 mmol/L    Urea Nitrogen 9 6 - 23 mg/dL    Creatinine 0.81 0.50 - 1.30 mg/dL    eGFR >90 >60 mL/min/1.73m*2    Calcium 9.0 8.6 - 10.3 mg/dL    Phosphorus 2.5 2.5 - 4.9 mg/dL    Albumin 4.1 3.4 - 5.0 g/dL   POCT GLUCOSE   Result Value Ref Range    POCT Glucose 120 (H) 74 - 99 mg/dL     Imaging  XR chest 1 view  Result Date: 4/18/2025  No acute cardiopulmonary process. Signed by Abdoulaye Malloy MD    CT brain attack head wo IV contrast  Result Date: 4/18/2025  Age-related atrophy and mild small-vessel ischemic changes of the cerebral white matter.   Chronic appearing lacunar infarcts are identified in the right caudate head and left left internal capsule.   No CT evidence of acute  intracranial hemorrhage or mass effect.     MACRO: None   Signed by: Rubén Thapa 4/18/2025 8:55 AM Dictation workstation:   [id number]      Cardiology, Vascular, and Other Imaging  Transthoracic Echo (TTE) Complete  Result Date: 4/18/2025            [facility] [address] [facility] [address]    Tel [phone] Fax [phone] TRANSTHORACIC ECHOCARDIOGRAM REPORT Patient Name:       AGUEDA MOJICA          Reading Physician:    [id number] Rey Elias MD Study Date:         4/18/2025            Ordering Provider:    [id number] SYLVIE TAM MRN/PID:            [id number]             Fellow: Accession#:         [id number]         Nurse:                Opal LOUISE Date of Birth/Age:  1960 / 65 years Sonographer:          Adilene Damian RDCS Gender Assigned at  M                    Additional Staff: Birth: Height:             175.26 cm            Admit Date: Weight:             90.26 kg             Admission Status:     Inpatient -                                                                Routine BSA / BMI:          2.06 m2 / 29.39      Department Location:  [facility] ICU Front                     kg/m2                                      (19-26) Blood Pressure: 172 /90 mmHg Study Type:    TRANSTHORACIC ECHO (TTE) COMPLETE Diagnosis/ICD: Cerebral Infarction, unspecified-I63.9 Indication:    Cerebrovascular Accident CPT Codes:     Echo Complete w Full Doppler-[id number] Patient History: Pertinent History: Two attempted bubble studies. Images TDS. Study Detail: The following Echo studies were performed: 2D, M-Mode, Doppler and               color flow. Image quality for this study is adequate. Technically               challenging study due to body habitus. Agitated saline used as a               contrast agent for  intraseptal flow evaluation.  PHYSICIAN INTERPRETATION: Left Ventricle: Left ventricular ejection fraction is normal, by visual estimate at 55-60%. There are no regional wall motion abnormalities. The left ventricular cavity size is normal. There is mild increased septal and mildly increased posterior left ventricular wall thickness. There is left ventricular concentric remodeling. Spectral Doppler shows a Grade I (impaired relaxation pattern) of left ventricular diastolic filling with normal left atrial filling pressure. Left Atrium: The left atrial size is normal. Right Ventricle: The right ventricle is normal in size. There is normal right ventricular global systolic function. Right Atrium: The right atrial size is normal. Aortic Valve: The aortic valve is trileaflet. There is no aortic valve cusp calcification noted. There is no evidence of aortic valve regurgitation. Mitral Valve: The mitral valve is normal in structure. There is no evidence of mitral valve regurgitation. Tricuspid Valve: The tricuspid valve is structurally normal. There is trace tricuspid regurgitation. The Doppler estimated RVSP is within normal limits at 29.2 mmHg. Pulmonic Valve: The pulmonic valve is structurally normal. There is physiologic pulmonic valve regurgitation. Pericardium: No pericardial effusion noted. Aorta: The aortic root is normal. There is no dilatation of the aortic arch. There is no dilatation of the ascending aorta. There is no dilatation of the aortic root. Systemic Veins: The inferior vena cava appears normal in size, with IVC inspiratory collapse greater than 50%.  CONCLUSIONS:  1. Left ventricular ejection fraction is normal, by visual estimate at 55-60%.  2. Spectral Doppler shows a Grade I (impaired relaxation pattern) of left ventricular diastolic filling with normal left atrial filling pressure.  3. There is normal right ventricular global systolic function.  4. Right ventricular systolic pressure is within  normal limits.  5. Negative bubble study. QUANTITATIVE DATA SUMMARY:  2D MEASUREMENTS:             Normal Ranges: LAs:             3.10 cm     (2.7-4.0cm) IVSd:            1.20 cm     (0.6-1.1cm) LVPWd:           1.13 cm     (0.6-1.1cm) LVIDd:           4.37 cm     (3.9-5.9cm) LVIDs:           3.09 cm LV Mass Index:   87.7 g/m2 LVEDV Index:     33.99 ml/m2 LV % FS          29.2 %  LEFT ATRIUM:                 Normal Ranges: LA Area A4C:      19.0 cm2 LA Area A2C:      15.0 cm2 LA Volume Index:  22.0 ml/m2 LA Vol A4C:       51.2 ml LA Vol A2C:       33.6 ml LA Vol Index BSA: 20.6 ml/m2  M-MODE MEASUREMENTS:         Normal Ranges: Ao Root:             3.10 cm (2.0-3.7cm) AoV Exc:             1.96 cm (1.5-2.5cm)  AORTA MEASUREMENTS:         Normal Ranges: AoV Exc:            1.96 cm (1.5-2.5cm) Ao Sinus, d:        3.60 cm (2.1-3.5cm) Ao STJ, d:          2.90 cm (1.7-3.4cm) Asc Ao, d:          3.70 cm (2.1-3.4cm)  LV SYSTOLIC FUNCTION:                      Normal Ranges: EF-A4C View:    54 % (>=55%) EF-A2C View:    54 % EF-Biplane:     54 % EF-Visual:      58 % LV EF Reported: 58 %  LV DIASTOLIC FUNCTION:             Normal Ranges: MV Peak E:             0.51 m/s    (0.7-1.2 m/s) MV Peak A:             0.91 m/s    (0.42-0.7 m/s) E/A Ratio:             0.56        (1.0-2.2) MV e'                  0.100 m/s   (>8.0) MV lateral e'          0.11 m/s MV medial e'           0.09 m/s E/e' Ratio:            5.08        (<8.0) PulmV Sys Jaylen:         54.44 cm/s PulmV Holly Jaylen:        30.80 cm/s PulmV S/D Jaylen:         1.77 PulmV A Revs Jaylen:      25.48 cm/s PulmV A Revs Dur:      122.74 msec  MITRAL VALVE:          Normal Ranges: MV DT:        207 msec (150-240msec)  AORTIC VALVE:          Normal Ranges: LVOT Diameter: 2.12 cm (1.8-2.4cm)  RIGHT VENTRICLE: RV Basal 2.70 cm RV Mid   1.90 cm RV Major 7.3 cm TAPSE:   18.0 mm RV s'    0.12 m/s  TRICUSPID VALVE/RVSP:          Normal Ranges: Peak TR Velocity:     2.56 m/s RV Syst  Pressure:     29 mmHg  (< 30mmHg) IVC Diam:             2.20 cm  PULMONARY VEINS: PulmV A Revs Dur: 122.74 msec PulmV A Revs Jaylen: 25.48 cm/s PulmV Holly Jalyen:   30.80 cm/s PulmV S/D Jaylen:    1.77 PulmV Sys Jaylen:    54.44 cm/s  AORTA: Asc Ao Diam 3.69 cm  [id number] Rey Elias MD Electronically signed on 4/18/2025 at 9:40:43 PM  ** Final **     ECG 12 lead  Result Date: 4/18/2025  Sinus rhythm with occasional Premature ventricular complexes Left axis deviation Abnormal ECG When compared with ECG of 25-NOV-2024 12:50, Premature ventricular complexes are now Present      Assessment/Plan   Assessment & Plan  Cerebrovascular accident (CVA), unspecified mechanism (Multi)      ASSESSMENT   59-year-old right-handed male with past medical history of benign prostatic hyperplasia (BPH) and vertigo presented to the ED this morning after awakening around 7 AM with gradual onset of right-sided arm and leg weakness, accompanied by numbness. He denied any similar symptoms the night prior. No associated aphasia, dysarthria, facial droop, neglect, or visual symptoms. Weakness was persistent upon ED arrival, though he noted some improvement over time, particularly in the right arm. However, he remained unable to bear weight on the right leg. He was evaluated urgently for stroke. Telestroke neurologist was consulted. NIH Stroke Scale score was 2 for limb ataxia and mild sensory loss. CT head showed no evidence of acute hemorrhage or mass effect. Chronic lacunar infarcts were noted. He was deemed not a candidate for mechanical thrombectomy and received TNK at 9:15 AM. Admitted to ICU for post-thrombolysis care.     PLAN       Neuro:   # Acute ischemic stroke (right hemiparesis leg> arm)  # History of vertigo/BPPV  # EtOH use disorder  -Presented with right hemibody weakness and numbness, persistent right leg weakness, worsening on 4/19/2025 with added mild right facial droop.  -CT negative for hemorrhage, chronic ischemic changes present  -Not a  thrombectomy candidate per teleneuro  - S/p TNK 20 mg at 9:15 AM  - Patient drinks 4 beers daily  - Lipid panels with high TG and non-HDL cholesterol confirming hyperlipidemia  - Echocardiogram with EF 55-60% and negative bubble study  - Negative UA and U tox  - HgbA1c is normal at 4.8   Plan:  -Admit to ICU for post-TNK monitoring x24h  -Hourly neurochecks x24h  -Repeat non-contrast head CT at 24h or sooner if neuro worsening  -Neurology consulted and on board  - Completion of stroke workup with MRI brain, MRA head and neck today  - As per radiology, patient had unclear history of foreign object in the eye for which orbital x-rays ordered before MRI/MRA  - Started today on aspirin 81 mg daily (after 24 hours from TNK)  - Lipid goals with healthy diet and statin therapy to achieve goal LDL-cholesterol<70 mg.  - Blood pressure goals: avoid hypotension SBP <100 that could worsen cerebral perfusion. Ischemic stroke post-thrombolysis- BP < 180/105 mmHg for 24hr. goal of 20-25% reduction keeping SBP 140s-160s  - Started on atorvastatin 40 mg daily.  -EtOH cessation counseling  - Currently on cardiac diet  -DVT prophylaxis: mechanical only x24h (then reassess for chemical prophylaxis)  -[facility] protocol  -[facility] ICU    Cardiac:   # Hypertension  # PVCs on EKG  -/98 on admission, continued to be elevated  -No ACS signs/symptoms  -NSR with PVCs and L axis deviation   Plan:  -Maintain SBP < 180 for 24h post-thrombolysis.  Goal for today -160  -Labetalol IV PRN for SBP > 180  -Continuous telemetry monitoring  -Consider outpatient Holter monitor  -Check troponin x2 to rule out silent ischemia  -Monitor hemodynamics, maintain PIV lines  -Maintain Goal MAP > 65  -Echocardiogram with adequate LVEF 55-60% and negative bubble study  -Monitor and optimize electrolytes, keep K > 4.0, Mag > 2.0    Pulmonary:   -No active conditions  -No distress, SpO2 98% on RA  - Lungs CTA BL  -Continuous pulse oximetry   -O2 PRN to maintain  SpO2 > 92%, wean as tolerated    Gastrointestinal:   -No active conditions other than EtOH consumption  - AST 47, likely from EtOH use  -Diet: N.p.o. post thrombolysis/until passing swallow eval  -Supplementation: Will add thiamine, folate, multivitamin  -On [facility] protocol  -Prophylaxis: protonix  -Bowel regimen: MiraLAX  -Last BM: Last BM Date: 25    Renal:   # Mild hyperkalemia-resolved  # Mild hyponatremia-improving  - Potassium 5.7, sodium 134 on admission  - No EKG changes suggestive of hyperkalemia  - Will continue cardiac monitoring  -Strict I&O's  -Daily RFP,Mg,Phos  -Electrolytes: Replete per protocol, goal K>4 Phos >3 Mg >2  Net IO Since Admission: -1,155 mL [25 0754]  Results from last 72 hours   Lab Units 25  03225  0908   BUN mg/dL 9 12   CREATININE mg/dL 0.81 0.83         Endocrine:   -No active issues  -Monitor blood glucose, screening for diabetes with A1c  -Goal BS < 180  Results from last 7 days   Lab Units 25  0350 25  0320 25  2349 25  2013 25  1548 25  1136 25  0926 25  0908   POCT GLUCOSE mg/dL 120*  --  119* 97 131* 104* 123*  --    GLUCOSE mg/dL  --  104*  --   --   --   --   --  100*        Hematology:   # Post thrombolysis status  - No current signs of bleeding, will monitor  - Anticoagulation held x 24 hours post TNK  -Daily CBC, coags  -DVT PPx within 24 hours/after MRI confirming no bleeding    Results from last 7 days   Lab Units 25  0320 25  0908   HEMOGLOBIN g/dL 13.6 15.2   HEMATOCRIT % 39.9* 45.2   PLATELETS AUTO x10*3/uL 222 207       Infectious Disease:   -No active conditions or concerns  -Vital signs regularly  - Daily CBC  Results from last 7 days   Lab Units 25  0320 25  0908   WBC AUTO x10*3/uL 4.9 4.6     Temp (24hrs), Av.6 °C (97.8 °F), Min:36.3 °C (97.3 °F), Max:37.3 °C (99.1 °F)     Musculoskeletal:   -No active conditions  -PT/OT to evaluate considering right-sided  weakness  - SW/CM on board    Lines/Tubes/Drains:   PIV's    CODE STATUS: Full Code    Disposition: Admitted to the ICU for post thrombolysis monitoring with neurology on consult, pending MRI/MRA, likely to be downgraded to floor today.  Patient also requires social work considering he is the caregiver of his ill wife with no other family support.    Code status: Full Code       Patient discussed with attending physician, Dr. Hector Morocho.     Regina Marie MD      This note has been transcribed using Dragon voice recognition system and there is a possibility of unintentional typing misprints.  Any information found to be copied from previous providers is done in the best interest of the patient to provide accurate, quality, and continuity of care.         [1]    [2] atorvastatin, 40 mg, oral, Nightly  folic acid, 1 mg, oral, Daily  multivitamin with minerals, 1 tablet, oral, Daily  perflutren lipid microspheres, 0.5-10 mL of dilution, intravenous, Once in imaging  perflutren lipid microspheres, 0.5-10 mL of dilution, intravenous, Once in imaging  perflutren protein A microsphere, 0.5 mL, intravenous, Once in imaging  perflutren protein A microsphere, 0.5 mL, intravenous, Once in imaging  sulfur hexafluoride microsphr, 2 mL, intravenous, Once in imaging  sulfur hexafluoride microsphr, 2 mL, intravenous, Once in imaging  thiamine, 100 mg, oral, Daily  [3]    [4] PRN medications: hydrALAZINE, hydrALAZINE, labetaloL, labetaloL, LORazepam **OR** LORazepam **OR** LORazepam, oxygen

## 2025-04-19 NOTE — PROGRESS NOTES
Donnie Nogueira is a 65 y.o. male on day 1 of admission presenting with Cerebrovascular accident (CVA), unspecified mechanism (Multi).      Subjective     Consulted by ICU team to take over care as patient is being downgraded. Feeling well. Reports feeling improvement in RLE symptoms.     Objective     Last Recorded Vitals  /78   Pulse 82   Temp 36.2 °C (97.2 °F) (Temporal)   Resp 25   Wt 90.5 kg (199 lb 8.3 oz)   SpO2 97%   Intake/Output last 3 Shifts:    Intake/Output Summary (Last 24 hours) at 4/19/2025 1555  Last data filed at 4/19/2025 1200  Gross per 24 hour   Intake 480 ml   Output 1865 ml   Net -1385 ml       Admission Weight  Weight: 80 kg (176 lb 5.9 oz) (04/18/25 0852)    Daily Weight  04/18/25 : 90.5 kg (199 lb 8.3 oz)    Image Results  XR orbits complete 4+ views  Narrative: Interpreted By:  Ang Baldwin,   STUDY:  XR ORBITS COMPLETE 4+ VIEWS;  4/19/2025 12:24 pm      INDICATION:  Signs/Symptoms: Rule out metal object. Career .      COMPARISON:  Yesterday's unenhanced head CT.      ACCESSION NUMBER(S):  [id number]      ORDERING CLINICIAN:  ALE AVALOS      FINDINGS:  No metallic intraorbital foreign body, fracture or destructive lesion  is seen. The visualized portions of the sinuses are well developed  and clear.      Impression: Normal study.      MACRO:  None      Signed by: Ang Baldwin 4/19/2025 1:06 PM  Dictation workstation:   [id number]      Physical Exam    Constitutional: Well developed, no distress, alert and cooperative  Skin: Warm and dry  Eyes: EOMI, clear sclera  ENMT: mucous membranes moist  Respiratory: Patent airways, CTAB  Cardiovascular: Regular, rate and rhythm, no murmurs  Abdominal: Nondistended, soft, non-tender, +BS  MSK: ROM intact  Neuro: alert and oriented x3      Assessment & Plan    59 year old male with history of BPH and vertigo who presented to the hospital with R sided weakness. Given tenecteplase in ED and admitted to ICU.  Medicine team consulted for care out of ICU.      R sided weakness, concern for CVA  -Presenting with RUE/RLE sided weakness   -CT head on admit non acute  -Received tenecteplase  at 0910 4/19/25  -Aspirin started 4/19, continue statin  -Obtain MRI brain, MRA head/neck, echocardiogram  -Neurology following   -PT/OT    HTN  -Started on lisinopril, titrate as needed     HLD  -Total chol 290, triglycerides 508 (unclear if fasting)  -Increase atorvastatin to 80mg daily  -Obtain fasting lipid panel     Alcohol Use  -Drinking 3-4 beers daily, educated on cessation   -Thiamine supplementation    DVT ppx: SCDs  Code status: Full  Dave Taylor DO

## 2025-04-19 NOTE — PROGRESS NOTES
Physical Therapy    Physical Therapy Evaluation and Treatment    Patient Name: Donnie Nogueira  MRN: [id number]  Today's Date: 4/19/2025   Time Calculation  Start Time: 1042  Stop Time: 1112  Time Calculation (min): 30 min  2124/2124-A    Assessment/Plan   PT Assessment  PT Assessment Results: Decreased strength, Decreased range of motion, Decreased endurance, Impaired balance, Decreased mobility, Decreased safety awareness, Impaired judgement  Rehab Prognosis: Good  Barriers to Discharge Home: Cognition needs, Physical needs  Cognition Needs: 24hr supervision for safety awareness needed, Insight of patient limited regarding functional ability/needs  Physical Needs: Stair navigation into home limited by function/safety, In-home setup navigation limited by function/safety, Ambulating household distances limited by function/safety, 24hr mobility assistance needed, High falls risk due to function or environment  Evaluation/Treatment Tolerance: Patient tolerated treatment well  Medical Staff Made Aware: Yes  End of Session Communication: Bedside nurse  Assessment Comment: Pt presents today below baseline level of function and requires continued PT during hospital stay. Pt requires 24 hour physical assist for all mobility to prevent falls. Pt is unsafe to navigate home environment at this time with available support and assist. Pt requires PT at a high intensity level at discharge to maximize functional mobility and safety. Anticipate pt will tolerate 3 hours of therapy daily. Pt is motivated to participate and requires daily, intensive, and short term therapy to return to high prior functional level.       End of Session Patient Position: Alarm on, Bed, 3 rail up  IP OR SWING BED PT PLAN  Inpatient or Swing Bed: Inpatient  PT Plan  Treatment/Interventions: Bed mobility, Transfer training, Gait training, Balance training, Neuromuscular re-education, Strengthening, Endurance training, Range of motion, Therapeutic exercise,  Therapeutic activity, Home exercise program  PT Plan: Ongoing PT  PT Frequency: Daily  PT Discharge Recommendations: High intensity level of continued care  Equipment Recommended upon Discharge: Wheeled walker  PT Recommended Transfer Status: Assist x2  PT - OK to Discharge: Yes (To next level of care when cleared by medical team   )    Subjective         General Visit Information:  General  Reason for Referral: impaired mobility  Referred By: Dio Morocho MD  Past Medical History Relevant to Rehab: To hospital with right sided weakness and sensory loss. PMH: vertigo, ETOH, dizziness  Family/Caregiver Present: Yes (spouse arrived during eval)  Co-Treatment: OT  Co-Treatment Reason: for safety  Prior to Session Communication: Bedside nurse  Patient Position Received: Bed, 3 rail up, Alarm on  Preferred Learning Style: verbal, kinesthetic  General Comment: Pt agreeable to PT, nursing cleared for treatment. Pt with fluctuating strength and numbness of right side at end of session; RN informed and aware.    Home Living:  Home Living  Type of Home: House  Lives With: Spouse  Home Layout: One level  Home Access: Stairs to enter with rails  Entrance Stairs-Number of Steps: 3  Bathroom Shower/Tub: Tub/shower unit    Prior Level of Function:  Prior Function Per Pt/Caregiver Report  ADL Assistance: Independent  Homemaking Assistance: Independent  Ambulatory Assistance: Independent  Prior Function Comments: denies any falls in the past 6 months    Precautions:  Precautions  Medical Precautions: Fall precautions  Precautions Comment: avoid hypotension SBP <100, Ischemic stroke post-thrombolysis- BP < 180/105    Vital Signs:  Vital Signs  BP:  (196/81 supine, sitting 178/70, sitting 144/83, RN aware and cleared pt for therapy. RN came to bedside during session to administer antihypertensive medication)  Objective     Pain:  Pain Assessment  Pain Assessment: 0-10  0-10 (Numeric) Pain Score: 0 - No  pain    Cognition:  Cognition  Sustained Attention: Impaired  Safety/Judgement:  (impaired safety awareness)  Insight: Severe  Impulsive: Severely    General Assessments:      Activity Tolerance  Endurance: Tolerates 10 - 20 min exercise with multiple rests (reports intermittent dizziness with mobility)  Sensation  Sensation Comment: reports numbness right LE     Perception  Motor Planning: Hand over hand to sequence tasks  Coordination  Movements are Fluid and Coordinated: No  Lower Body Coordination: impaired coordination to right LE  Coordination Comment: right foot to target impaired     Static Sitting Balance  Static Sitting-Comment/Number of Minutes: good  Dynamic Sitting Balance  Dynamic Sitting-Comments: good-  Static Standing Balance  Static Standing-Comment/Number of Minutes: poor, retro lean  Dynamic Standing Balance  Dynamic Standing-Comments: poor    Functional Assessments:     Bed Mobility  Bed Mobility: Yes  Bed Mobility 1  Bed Mobility 1: Supine to sitting  Level of Assistance 1: Contact guard  Bed Mobility 2  Bed Mobility  2: Sitting to supine  Level of Assistance 2: Minimum assistance  Transfers  Transfer: Yes  Transfer 1  Transfer From 1: Sit to  Transfer to 1: Stand  Transfer Device 1: Gait belt  Transfer Level of Assistance 1: Moderate assistance, +2, Maximum verbal cues  Trials/Comments 1: using arm in arm assist.  Transfers 2  Transfer From 2: Stand to  Transfer to 2: Sit  Transfer Level of Assistance 2: Moderate assistance, +2, Maximum verbal cues  Ambulation/Gait Training  Ambulation/Gait Training Performed: Yes  Ambulation/Gait Training 1  Gait Support Devices: Gait belt  Assistance 1: Moderate assistance (x 2, with arm in arm assist)  Quality of Gait 1: Decreased step length, Inconsistent stride length (unsteady, needs increased cues for sequencing, difficulty with placement and proper advancement of right LE)  Comments/Distance (ft) 1: 3 side steps along EOB with arm in arm assist. Right  UE/LE flexion synergy noted with standing marching and side steps        Treatment    Cues for safe hand placement for sit<>stand. Instruction in standing marching and side stepping along EOB with verbal and tactile cues provided to facilitate advancement of right LE. Cues for safe technique and pacing throughout for completion of mobility. Skilled monitoring of vitals throughout to ensure safe mobility progression.     Extremity/Trunk Assessments:        AROM RLE (degrees)  RLE AROM Comment: WFL  Strength RLE  R Hip Flexion: 3-/5  R Hip Extension: 4/5  R Knee Flexion: 4/5  R Knee Extension: 4/5  R Ankle Dorsiflexion: 5/5  R Ankle Plantar Flexion: 5/5  LLE   LLE : Within Functional Limits    Outcome Measures:     [facility] Basic Mobility  Turning from your back to your side while in a flat bed without using bedrails: A little  Moving from lying on your back to sitting on the side of a flat bed without using bedrails: A little  Moving to and from bed to chair (including a wheelchair): A lot  Standing up from a chair using your arms (e.g. wheelchair or bedside chair): A lot  To walk in hospital room: A lot  Climbing 3-5 steps with railing: Total  Basic Mobility - Total Score: 13              Goals:  Encounter Problems       Encounter Problems (Active)       PT Problem       Pt will perform bed mobility with mod I.  (Progressing)       Start:  04/19/25    Expected End:  05/03/25            Pt will complete sit <> stand and bed <> chair transfers with min A.   (Progressing)       Start:  04/19/25    Expected End:  05/03/25            Pt will ambulate 50 feet min A using FWW with no LOB. (Progressing)       Start:  04/19/25    Expected End:  05/03/25            Pt will increase right LE strength to 5/5 in all muscle groups.  (Progressing)       Start:  04/19/25    Expected End:  05/03/25                 Education Documentation  Precautions, taught by Brittney Long PT at 4/19/2025  1:07 PM.  Learner: Patient  Readiness:  Acceptance  Method: Explanation  Response: Verbalizes Understanding, Needs Reinforcement    Body Mechanics, taught by Brittney Long, PT at 4/19/2025  1:07 PM.  Learner: Patient  Readiness: Acceptance  Method: Explanation  Response: Verbalizes Understanding, Needs Reinforcement    Mobility Training, taught by Brittney Long, PT at 4/19/2025  1:07 PM.  Learner: Patient  Readiness: Acceptance  Method: Explanation  Response: Verbalizes Understanding, Needs Reinforcement    Education Comments  No comments found.

## 2025-04-19 NOTE — SIGNIFICANT EVENT
59-year-old right-handed male with past medical history of benign prostatic hyperplasia (BPH) and vertigo presented to the ED this morning after awakening around 7 AM with gradual onset of right-sided arm and leg weakness, accompanied by numbness. He denied any similar symptoms the night prior. No associated aphasia, dysarthria, facial droop, neglect, or visual symptoms. Weakness was persistent upon ED arrival, though he noted some improvement over time, particularly in the right arm. However, he remained unable to bear weight on the right leg. He was evaluated urgently for stroke. Telestroke neurologist was consulted. NIH Stroke Scale score was 2 for limb ataxia and mild sensory loss. CT head showed no evidence of acute hemorrhage or mass effect. Chronic lacunar infarcts were noted. He was deemed not a candidate for mechanical thrombectomy and received TNK at 9:15 AM. Admitted to ICU for post-thrombolysis care.   Neurology on board.  Statin and aspirin started.  MRI/MRA head and neck are being done.  Patient passed the 24 hours post TNK.  He can start on DVT PPx, pending MRI/MRI results.  He has no need for ICU level of care at this point, was downgraded to [facility] on telemetry under the care of Dr. Taylor who kindly accepted the patient.

## 2025-04-20 VITALS
TEMPERATURE: 97.5 F | WEIGHT: 200.18 LBS | OXYGEN SATURATION: 96 % | RESPIRATION RATE: 18 BRPM | HEIGHT: 69 IN | BODY MASS INDEX: 29.65 KG/M2 | SYSTOLIC BLOOD PRESSURE: 152 MMHG | HEART RATE: 73 BPM | DIASTOLIC BLOOD PRESSURE: 92 MMHG

## 2025-04-20 LAB
ALBUMIN SERPL BCP-MCNC: 4 G/DL (ref 3.4–5)
ALP SERPL-CCNC: 57 U/L (ref 33–136)
ALT SERPL W P-5'-P-CCNC: 15 U/L (ref 10–52)
ANION GAP SERPL CALC-SCNC: 11 MMOL/L (ref 10–20)
AST SERPL W P-5'-P-CCNC: 16 U/L (ref 9–39)
BILIRUB SERPL-MCNC: 0.8 MG/DL (ref 0–1.2)
BUN SERPL-MCNC: 10 MG/DL (ref 6–23)
CALCIUM SERPL-MCNC: 9.1 MG/DL (ref 8.6–10.3)
CHLORIDE SERPL-SCNC: 107 MMOL/L (ref 98–107)
CHOLEST SERPL-MCNC: 209 MG/DL (ref 0–199)
CHOLESTEROL/HDL RATIO: 4.4
CO2 SERPL-SCNC: 23 MMOL/L (ref 21–32)
CREAT SERPL-MCNC: 0.76 MG/DL (ref 0.5–1.3)
EGFRCR SERPLBLD CKD-EPI 2021: >90 ML/MIN/1.73M*2
ERYTHROCYTE [DISTWIDTH] IN BLOOD BY AUTOMATED COUNT: 13.1 % (ref 11.5–14.5)
GLUCOSE BLD MANUAL STRIP-MCNC: 112 MG/DL (ref 74–99)
GLUCOSE BLD MANUAL STRIP-MCNC: 118 MG/DL (ref 74–99)
GLUCOSE BLD MANUAL STRIP-MCNC: 126 MG/DL (ref 74–99)
GLUCOSE SERPL-MCNC: 112 MG/DL (ref 74–99)
HCT VFR BLD AUTO: 39 % (ref 41–52)
HDLC SERPL-MCNC: 47.2 MG/DL
HGB BLD-MCNC: 13.3 G/DL (ref 13.5–17.5)
LDLC SERPL CALC-MCNC: 100 MG/DL
MAGNESIUM SERPL-MCNC: 2.19 MG/DL (ref 1.6–2.4)
MCH RBC QN AUTO: 31.2 PG (ref 26–34)
MCHC RBC AUTO-ENTMCNC: 34.1 G/DL (ref 32–36)
MCV RBC AUTO: 92 FL (ref 80–100)
NON HDL CHOLESTEROL: 162 MG/DL (ref 0–149)
NRBC BLD-RTO: 0 /100 WBCS (ref 0–0)
PLATELET # BLD AUTO: 198 X10*3/UL (ref 150–450)
POTASSIUM SERPL-SCNC: 3.5 MMOL/L (ref 3.5–5.3)
PROT SERPL-MCNC: 6.2 G/DL (ref 6.4–8.2)
RBC # BLD AUTO: 4.26 X10*6/UL (ref 4.5–5.9)
SODIUM SERPL-SCNC: 137 MMOL/L (ref 136–145)
TRIGL SERPL-MCNC: 311 MG/DL (ref 0–149)
VLDL: 62 MG/DL (ref 0–40)
WBC # BLD AUTO: 4.9 X10*3/UL (ref 4.4–11.3)

## 2025-04-20 PROCEDURE — 36415 COLL VENOUS BLD VENIPUNCTURE: CPT | Performed by: STUDENT IN AN ORGANIZED HEALTH CARE EDUCATION/TRAINING PROGRAM

## 2025-04-20 PROCEDURE — 82947 ASSAY GLUCOSE BLOOD QUANT: CPT

## 2025-04-20 PROCEDURE — 97110 THERAPEUTIC EXERCISES: CPT | Mod: GP,CQ

## 2025-04-20 PROCEDURE — 97112 NEUROMUSCULAR REEDUCATION: CPT | Mod: GO,CO

## 2025-04-20 PROCEDURE — 2500000001 HC RX 250 WO HCPCS SELF ADMINISTERED DRUGS (ALT 637 FOR MEDICARE OP)

## 2025-04-20 PROCEDURE — 92610 EVALUATE SWALLOWING FUNCTION: CPT | Mod: GN | Performed by: STUDENT IN AN ORGANIZED HEALTH CARE EDUCATION/TRAINING PROGRAM

## 2025-04-20 PROCEDURE — 80053 COMPREHEN METABOLIC PANEL: CPT | Performed by: STUDENT IN AN ORGANIZED HEALTH CARE EDUCATION/TRAINING PROGRAM

## 2025-04-20 PROCEDURE — 83735 ASSAY OF MAGNESIUM: CPT

## 2025-04-20 PROCEDURE — 2500000001 HC RX 250 WO HCPCS SELF ADMINISTERED DRUGS (ALT 637 FOR MEDICARE OP): Performed by: STUDENT IN AN ORGANIZED HEALTH CARE EDUCATION/TRAINING PROGRAM

## 2025-04-20 PROCEDURE — 80061 LIPID PANEL: CPT | Performed by: STUDENT IN AN ORGANIZED HEALTH CARE EDUCATION/TRAINING PROGRAM

## 2025-04-20 PROCEDURE — 99239 HOSP IP/OBS DSCHRG MGMT >30: CPT | Performed by: STUDENT IN AN ORGANIZED HEALTH CARE EDUCATION/TRAINING PROGRAM

## 2025-04-20 PROCEDURE — 97116 GAIT TRAINING THERAPY: CPT | Mod: GP,CQ

## 2025-04-20 PROCEDURE — 97535 SELF CARE MNGMENT TRAINING: CPT | Mod: GO,CO

## 2025-04-20 PROCEDURE — 85027 COMPLETE CBC AUTOMATED: CPT

## 2025-04-20 RX ORDER — ATORVASTATIN CALCIUM 40 MG/1
40 TABLET, FILM COATED ORAL DAILY
Qty: 30 TABLET | Refills: 1 | Status: SHIPPED | OUTPATIENT
Start: 2025-04-20 | End: 2025-06-19

## 2025-04-20 RX ORDER — LISINOPRIL 5 MG/1
5 TABLET ORAL DAILY
Qty: 30 TABLET | Refills: 1 | Status: SHIPPED | OUTPATIENT
Start: 2025-04-21 | End: 2025-06-20

## 2025-04-20 RX ORDER — ASPIRIN 81 MG/1
81 TABLET ORAL DAILY
Qty: 30 TABLET | Refills: 1 | Status: SHIPPED | OUTPATIENT
Start: 2025-04-21 | End: 2025-06-20

## 2025-04-20 RX ADMIN — LISINOPRIL 5 MG: 2.5 TABLET ORAL at 09:28

## 2025-04-20 RX ADMIN — THIAMINE HCL TAB 100 MG 100 MG: 100 TAB at 09:28

## 2025-04-20 RX ADMIN — Medication 1 TABLET: at 09:28

## 2025-04-20 RX ADMIN — FOLIC ACID 1 MG: 1 TABLET ORAL at 09:28

## 2025-04-20 RX ADMIN — ASPIRIN 81 MG: 81 TABLET, COATED ORAL at 09:28

## 2025-04-20 ASSESSMENT — LIFESTYLE VARIABLES
TOTAL SCORE: 0
ANXIETY: NO ANXIETY, AT EASE
ANXIETY: NO ANXIETY, AT EASE
NAUSEA AND VOMITING: NO NAUSEA AND NO VOMITING
NAUSEA AND VOMITING: NO NAUSEA AND NO VOMITING
HEADACHE, FULLNESS IN HEAD: NOT PRESENT
VISUAL DISTURBANCES: NOT PRESENT
TREMOR: NO TREMOR
ANXIETY: NO ANXIETY, AT EASE
PAROXYSMAL SWEATS: NO SWEAT VISIBLE
TOTAL SCORE: 0
ORIENTATION AND CLOUDING OF SENSORIUM: ORIENTED AND CAN DO SERIAL ADDITIONS
AGITATION: NORMAL ACTIVITY
TREMOR: NO TREMOR
AGITATION: NORMAL ACTIVITY
TOTAL SCORE: 0
BLOOD PRESSURE: 104/71
VISUAL DISTURBANCES: NOT PRESENT
NAUSEA AND VOMITING: NO NAUSEA AND NO VOMITING
AGITATION: NORMAL ACTIVITY
PAROXYSMAL SWEATS: NO SWEAT VISIBLE
HEADACHE, FULLNESS IN HEAD: NOT PRESENT
TREMOR: NO TREMOR
VISUAL DISTURBANCES: NOT PRESENT
PAROXYSMAL SWEATS: NO SWEAT VISIBLE
HEADACHE, FULLNESS IN HEAD: NOT PRESENT
ORIENTATION AND CLOUDING OF SENSORIUM: ORIENTED AND CAN DO SERIAL ADDITIONS
AUDITORY DISTURBANCES: NOT PRESENT
AUDITORY DISTURBANCES: NOT PRESENT
ORIENTATION AND CLOUDING OF SENSORIUM: ORIENTED AND CAN DO SERIAL ADDITIONS
AUDITORY DISTURBANCES: NOT PRESENT

## 2025-04-20 ASSESSMENT — COGNITIVE AND FUNCTIONAL STATUS - GENERAL
PERSONAL GROOMING: A LITTLE
MOVING FROM LYING ON BACK TO SITTING ON SIDE OF FLAT BED WITH BEDRAILS: A LITTLE
HELP NEEDED FOR BATHING: A LITTLE
DAILY ACTIVITIY SCORE: 20
STANDING UP FROM CHAIR USING ARMS: A LITTLE
TURNING FROM BACK TO SIDE WHILE IN FLAT BAD: A LITTLE
WALKING IN HOSPITAL ROOM: A LITTLE
MOVING TO AND FROM BED TO CHAIR: A LITTLE
MOBILITY SCORE: 18
TOILETING: A LITTLE
CLIMB 3 TO 5 STEPS WITH RAILING: A LITTLE
DRESSING REGULAR LOWER BODY CLOTHING: A LITTLE

## 2025-04-20 ASSESSMENT — PAIN SCALES - GENERAL
PAINLEVEL_OUTOF10: 0 - NO PAIN

## 2025-04-20 ASSESSMENT — PAIN - FUNCTIONAL ASSESSMENT
PAIN_FUNCTIONAL_ASSESSMENT: 0-10

## 2025-04-20 ASSESSMENT — ACTIVITIES OF DAILY LIVING (ADL): HOME_MANAGEMENT_TIME_ENTRY: 16

## 2025-04-20 NOTE — PROGRESS NOTES
Physical Therapy    Physical Therapy Treatment    Patient Name: Donnie Nogueira  MRN: [id number]  Today's Date: 4/20/2025  Time Calculation  Start Time: 1100  Stop Time: 1130  Time Calculation (min): 30 min     3020/3020-A       04/20/25 1100   PT  Visit   PT Received On 04/20/25   Response to Previous Treatment Patient with no complaints from previous session.   General   Referred By Dio Morocho MD   Past Medical History Relevant to Rehab To hospital with right sided weakness and sensory loss. PMH: vertigo, ETOH, dizziness  Patient received TNK in ED with NIHSS=2 and BP was 206/93.   Prior to Session Communication Bedside nurse   Patient Position Received Bed, 3 rail up   Preferred Learning Style verbal;visual   Precautions   Medical Precautions Fall precautions   Pain Assessment   Pain Assessment 0-10   0-10 (Numeric) Pain Score 0 - No pain   Cognition   Overall Cognitive Status WFL   Orientation Level Oriented X4   Safety/Judgement WFL   Insight WFL   Coordination   Movements are Fluid and Coordinated Yes   Finger to Nose Intact   Rapid Alternating Movements Intact   Finger to Target Intact   Postural Control   Postural Control WFL   Static Sitting Balance   Static Sitting-Balance Support No upper extremity supported   Static Sitting-Level of Assistance Independent   Dynamic Sitting Balance   Dynamic Sitting-Balance Support No upper extremity supported   Dynamic Sitting-Level of Assistance Distant supervision   Static Standing Balance   Static Standing-Balance Support No upper extremity supported   Static Standing-Level of Assistance Close supervision   Dynamic Standing Balance   Dynamic Standing-Balance Support No upper extremity supported   Dynamic Standing-Level of Assistance Distant supervision   Therapeutic Exercise   Therapeutic Exercise Performed Yes   Therapeutic Exercise Activity 1 PF/DF x15   Therapeutic Exercise Activity 2 marches x15   Therapeutic Exercise Activity 3 LAQ x15   Therapeutic Exercise  Activity 4 pillow squeeze x15   Therapeutic Exercise Activity 5 resisted hip ABD x15   Balance/Neuromuscular Re-Education   Balance/Neuromuscular Re-Education Activity Performed Yes   Balance/Neuromuscular Re-Education Activity 1 reaching across midline to various target heights   Balance/Neuromuscular Re-Education Activity 2 reaching fowrward out of ANNE-MARIE   Ambulation/Gait Training   Ambulation/Gait Training Performed Yes   Ambulation/Gait Training 1   Surface 1 Level tile   Device 1 Ashley rail  (PRN)   Gait Support Devices Gait belt   Assistance 1 Close supervision   Quality of Gait 1 Inconsistent stride length   Comments/Distance (ft) 1 250'   Transfers   Transfer Yes   Transfer 1   Transfer From 1 Bed to   Transfer to 1 Stand   Technique 1 Sit to stand;Stand to sit   Transfer Device 1 Gait belt   Transfer Level of Assistance 1 Close supervision   Trials/Comments 1 x4 STS trials    Object From Floor   Devices No device   Assist Level Independent   Comments x2 trials   Activity Tolerance   Endurance Endurance does not limit participation in activity   Early Mobility/Exercise Safety Screen Proceed with mobilization - No exclusion criteria met   PT Assessment   PT Assessment Results Decreased strength   Rehab Prognosis Excellent   Evaluation/Treatment Tolerance Patient tolerated treatment well   End of Session Communication Bedside nurse;Care Coordinator;Physician   End of Session Patient Position   (sitting edge of bed with HSU in room.  alarm off, nurse aware.)   Outpatient Education   Individual(s) Educated Patient   Education Provided Fall Risk;Body Mechanics;POC   Risk and Benefits Discussed with Patient/Caregiver/Other yes   Patient/Caregiver Demonstrated Understanding yes   Plan of Care Discussed and Agreed Upon yes   Patient Response to Education Patient/Caregiver Verbalized Understanding of Information   PT Plan   Inpatient/Swing Bed or Outpatient Inpatient   PT Plan   Treatment/Interventions Transfer  training;Gait training;Balance training;Neuromuscular re-education;Therapeutic exercise   PT Plan Ongoing PT   PT Frequency Daily   PT Discharge Recommendations High intensity level of continued care   Equipment Recommended upon Discharge Straight cane   PT Recommended Transfer Status Stand by assist         Outcome Measures:  [facility] Basic Mobility  Turning from your back to your side while in a flat bed without using bedrails: A little  Moving from lying on your back to sitting on the side of a flat bed without using bedrails: A little  Moving to and from bed to chair (including a wheelchair): A little  Standing up from a chair using your arms (e.g. wheelchair or bedside chair): A little  To walk in hospital room: A little  Climbing 3-5 steps with railing: A little  Basic Mobility - Total Score: 18          EDUCATION:  Outpatient Education  Individual(s) Educated: Patient  Education Provided: Fall Risk, Body Mechanics, POC  Risk and Benefits Discussed with Patient/Caregiver/Other: yes  Patient/Caregiver Demonstrated Understanding: yes  Plan of Care Discussed and Agreed Upon: yes  Patient Response to Education: Patient/Caregiver Verbalized Understanding of Information      GOALS:  Encounter Problems       Encounter Problems (Active)       PT Problem       Pt will perform bed mobility with mod I.  (Progressing)       Start:  04/19/25    Expected End:  05/03/25            Pt will complete sit <> stand and bed <> chair transfers with min A.   (Progressing)       Start:  04/19/25    Expected End:  05/03/25            Pt will ambulate 50 feet min A using FWW with no LOB. (Progressing)       Start:  04/19/25    Expected End:  05/03/25            Pt will increase right LE strength to 5/5 in all muscle groups.  (Progressing)       Start:  04/19/25    Expected End:  05/03/25               Pain - Adult

## 2025-04-20 NOTE — NURSING NOTE
0700: Assumed care of pt     0928: Pt lying in bed with HOB elevated, friend at bedside. Pt denies any pain or discomfort at this time. Facial symmetry is even and speech is clear. Pt answers questions appropriately and follows simple commands. Skin is warm dry and intact, color is WNL for ethnicity. CIWA completed, see below.     04/20/25 0800   CIWA-Ar   BP (!) 165/93  (nurse notified)   Heart Rate 60   Nausea and Vomiting 0   Tactile Disturbances 0   Tremor 0   Auditory Disturbances 0   Paroxysmal Sweats 0   Visual Disturbances 0   Anxiety 0   Headache, Fullness in Head 0   Agitation 0   Orientation and Clouding of Sensorium 0   CIWA-Ar Total 0       1200: CIWA 0    1425: Discharge education provided to pt at this time. Pt denies any further questions, tele and IV removed. VSS. Pt transported safely to vehicle at this time.      04/20/25 1425   Vital Signs   Temp 36.4 °C (97.5 °F)   Heart Rate 73   Resp 18   BP (!) 152/92  (nurse notified)   BP Method Automatic   Medical Gas Therapy   SpO2 96 %   Medical Gas Therapy None (Room air)

## 2025-04-20 NOTE — CARE PLAN
The patient's goals for the shift include      The clinical goals for the shift include Neurological status will not change during shift      Problem: General Stroke  Goal: Establish a mutual long term goal with patient by discharge  Outcome: Progressing  Goal: Demonstrate improvement in neurological exam throughout the shift  Outcome: Progressing  Goal: Maintain BP within ordered limits throughout shift  Outcome: Progressing     Problem: Discharge Planning  Goal: Discharge to home or other facility with appropriate resources  Outcome: Progressing     Problem: Chronic Conditions and Co-morbidities  Goal: Patient's chronic conditions and co-morbidity symptoms are monitored and maintained or improved  Outcome: Progressing

## 2025-04-20 NOTE — DISCHARGE SUMMARY
Discharge Diagnosis  Cerebrovascular accident (CVA), unspecified mechanism (Multi)    Issues Requiring Follow-Up    You were diagnosed with an acute stoke this hospital stay that required tenecteplase (clot buster drug)    Started on aspirin 81mg daily and atorvastatin 40mg daily for stroke prevention and given findings of high cholesterol    Started on lisinopril 5mg daily for blood pressure control      Referral made for outpatient PT/OT    You will need outpatient follow up with neurology for post stroke care    Recommend cutting down on alcohol use    Discharge Meds     Medication List      START taking these medications     aspirin 81 mg EC tablet; Take 1 tablet (81 mg) by mouth once daily.;   Start taking on: April 21, 2025   atorvastatin 40 mg tablet; Commonly known as: Lipitor; Take 1 tablet (40   mg) by mouth once daily.   lisinopril 5 mg tablet; Take 1 tablet (5 mg) by mouth once daily.; Start   taking on: April 21, 2025     CONTINUE taking these medications     meclizine 25 mg tablet; Commonly known as: Antivert; Take 1 tablet (25   mg) by mouth 3 times a day as needed for dizziness.   psyllium 0.4 gram capsule; Commonly known as: Metamucil   saw-[place]-cranb-bsito-B6-zinc 160--90 mg capsule       Test Results Pending At Discharge  Pending Labs       No current pending labs.            Hospital Course    65 year old male with medical history of BPH and vertigo who presented to [facility] ED 4/18/25 with R sided weakness, RLE>RUE. Symptoms started at 0700 on morning of arrival. He was a stroke alert in the ED. CT head was negative for acute intracranial pathology. He was given TNK and admitted to the ICU for post TNK care. MRI brain 4/19/25 with findings of acute/subacute infarct in left corona radiata. MRA head/neck without severe stenosis and occlusion.     A1c was 4.8. Fasting lipid panel with total cholesterol 209, , and triglycerides 311. He was started on aspirin and atorvastatin 40mg  daily. Echocardiogram revealed EF 55% with negative bubble study. Was also noted to have hypertension and started on lisinopril 5mg daily. His right sided weakness gradually improved. He worked with PT/OT and was given referral for outpatient therapy. He will follow with neurology as an outpatient. He does drink 4 beers daily. He was educated on alcohol cessation.    Greater than 30 minutes was spent facilitating this patients discharge from the hospital which included examining the patient, reconciling medications, and making arrangements for future care.       Pertinent Physical Exam At Time of Discharge  Physical Exam    Constitutional: Well developed, no distress, alert and cooperative  Skin: Warm and dry  Eyes: EOMI, clear sclera  ENMT: mucous membranes moist  Respiratory: Patent airways, CTAB  Cardiovascular: Regular, rate and rhythm  Abdominal: Nondistended, soft  MSK: Mild RLE weakness  Neuro: alert and oriented x3      Outpatient Follow-Up  No future appointments.      Dave Taylor DO

## 2025-04-20 NOTE — PROGRESS NOTES
Occupational Therapy    OT Treatment    Patient Name: Donnie Nogueira  MRN: [id number]  Today's Date: 4/20/2025  Time Calculation  Start Time: 1124  Stop Time: 1150  Time Calculation (min): 26 min       3020/3020-A    Assessment:  End of Session Communication: Bedside nurse  End of Session Patient Position: Bed, 3 rail up, Alarm off, not on at start of session (pt eating lunch)       Plan:  OT Frequency: Daily  OT Discharge Recommendations: High intensity level of continued care     Subjective      04/20/25 1124   OT Last Visit   OT Received On 04/20/25   General   Reason for Referral ADLs   Referred By Dio Morocho MD   Past Medical History Relevant to Rehab To hospital with right sided weakness and sensory loss. PMH: vertigo, ETOH, dizziness  Patient received TNK in ED with NIHSS=2 and BP was 206/93.   Prior to Session Communication Bedside nurse   Patient Position Received Bed, 3 rail up   General Comment PT sitting EOB, finishing PT tx and agreeable to OT tx. Cleared for participation.   Precautions   Medical Precautions Fall precautions   Vital Signs   Vitals Session Post OT   Heart Rate 67   Heart Rate Source Monitor   BP (!) 174/97   BP Method Automatic   Patient Position Sitting   Vital Signs Comment post session, pt curious of BP. Nursing informed   Pain Assessment   0-10 (Numeric) Pain Score 0 - No pain   Cognition   Overall Cognitive Status WFL   Orientation Level Oriented X4   Insight WFL   Coordination   Coordination Comment thumb>finger opposition slightly impaired R hand   Feeding   Feeding Level of Assistance Independent   Feeding Where Assessed Edge of bed   Feeding Comments Pt received lunch at end of tx; able to manage all lids to containters (butter, wade packet) and remove ceran wrap.   Grooming   Grooming Comments pt had already completed grooming and shaving task at sink prior to tx   UE Dressing   UE Dressing Level of Assistance Independent   UE Dressing Where Assessed Edge of bed   UE  Dressing Comments Pt educated in benefits of seated dressing routine with understanding and return demo. Pt doffed gown, donned pullover t-shirt I following self item retreival out of belongings.   Functional Mobility   Functional Mobility Performed   (Pt ambulating short distances within room, no AD utilized, S overall to retrieve shirt from personal belongings.)   Transfer 1   Technique 1 Sit to stand;Stand to sit   Transfer Level of Assistance 1 Distant supervision   Trials/Comments 1 STS functional transfers at EOB level   Tub Transfers   Tub Transfers Comments Educate d in benefits of shower chair with verbal understanding. Pt reports has chair available at home.   Therapeutic Exercise   Therapeutic Exercise Activity 1 Pt engaged in R UE therex/AROM through all functional planes while seated and educated in completion throughout the day. Pt moves R UE through full ROM.   Therapeutic Exercise Activity 2 Pt completed R hand squeezes and thumb><finger opposition which is mildy impaired.   IP OT Assessment   End of Session Communication Bedside nurse   End of Session Patient Position Bed, 3 rail up;Alarm off, not on at start of session  (pt eating lunch)   Inpatient Plan   OT Frequency Daily   OT Discharge Recommendations High intensity level of continued care       Outcome Measures:[facility] Daily Activity  Putting on and taking off regular lower body clothing: A little  Bathing (including washing, rinsing, drying): A little  Putting on and taking off regular upper body clothing: None  Toileting, which includes using toilet, bedpan or urinal: A little  Taking care of personal grooming such as brushing teeth: A little  Eating Meals: None  Daily Activity - Total Score: 20  Education Documentation  No documentation found.  Education Comments  No comments found.            Goals:  Encounter Problems       Encounter Problems (Active)       Dressing Upper Extremities       STG - Patient will dress upper body with CGA  (Progressing)       Start:  04/19/25    Expected End:  05/03/25               Dressings Lower Extremities       STG - Patient will complete lower body dressing with CGA with comp strategies and AE (Progressing)       Start:  04/19/25    Expected End:  05/03/25               Functional Balance       STG-Patient will be CGA with assistive device dynamic stand task >5 minutes for ADL completion   (Progressing)       Start:  04/19/25    Expected End:  05/03/25               Functional Mobility       STG-Patient will be min assist with assistive device functional mobility tasks   (Progressing)       Start:  04/19/25    Expected End:  05/03/25               OT Transfers       STG-Patient will be CGA with functional transfers demonstrating good safety   (Progressing)       Start:  04/19/25    Expected End:  05/03/25

## 2025-04-20 NOTE — CARE PLAN
Problem: General Stroke  Goal: Establish a mutual long term goal with patient by discharge  Outcome: Progressing  Flowsheets (Taken 4/18/2025 1202 by Servando Oconnor RN)  Establish a mutual long term goal with patient by discharge:   Attend medical follow-up appointments   Eat healthy   Engage in physical activity   Monitor blood glucose   Monitor blood pressure  Goal: Maintain BP within ordered limits throughout shift  Outcome: Progressing     Problem: Discharge Planning  Goal: Discharge to home or other facility with appropriate resources  Outcome: Progressing  Flowsheets (Taken 4/19/2025 1593)  Discharge to home or other facility with appropriate resources: Identify barriers to discharge with patient and caregiver     Problem: Chronic Conditions and Co-morbidities  Goal: Patient's chronic conditions and co-morbidity symptoms are monitored and maintained or improved  Outcome: Progressing  Flowsheets (Taken 4/19/2025 6890)  Care Plan - Patient's Chronic Conditions and Co-Morbidity Symptoms are Monitored and Maintained or Improved:   Monitor and assess patient's chronic conditions and comorbid symptoms for stability, deterioration, or improvement   Collaborate with multidisciplinary team to address chronic and comorbid conditions and prevent exacerbation or deterioration

## 2025-04-20 NOTE — NURSING NOTE
0640 - Report called for pt. Transferring into 3020.  CCU nurse, Tomas, states pt. Will be  moved prior to shift change.   He is alert and oriented.  Denies pain.  Neuro checks reported as unchanged this shift.   Nurse also reports that the pt. Is on CIWA questionnaire to monitor withdrawal and has been negative this shift.   Will prepare room and review pt. Orders.

## 2025-04-20 NOTE — DISCHARGE INSTRUCTIONS
You were diagnosed with an acute stoke this hospital stay that required tenecteplase (clot buster drug)    Started on aspirin 81mg daily and atorvastatin 40mg daily for stroke prevention and given findings of high cholesterol    Started on lisinopril 5mg daily for blood pressure control      Referral made for outpatient PT/OT    You will need outpatient follow up with neurology for post stroke care

## 2025-04-29 ENCOUNTER — APPOINTMENT (OUTPATIENT)
Dept: PRIMARY CARE | Facility: CLINIC | Age: 65
End: 2025-04-29
Payer: MEDICARE

## 2025-04-29 ENCOUNTER — TELEPHONE (OUTPATIENT)
Dept: NEUROLOGY | Facility: CLINIC | Age: 65
End: 2025-04-29

## 2025-04-29 VITALS
SYSTOLIC BLOOD PRESSURE: 130 MMHG | HEIGHT: 68 IN | BODY MASS INDEX: 30.19 KG/M2 | DIASTOLIC BLOOD PRESSURE: 78 MMHG | OXYGEN SATURATION: 94 % | WEIGHT: 199.2 LBS | TEMPERATURE: 97.8 F | HEART RATE: 57 BPM

## 2025-04-29 DIAGNOSIS — E78.2 MIXED HYPERLIPIDEMIA: Primary | ICD-10-CM

## 2025-04-29 DIAGNOSIS — E66.09 CLASS 1 OBESITY DUE TO EXCESS CALORIES WITHOUT SERIOUS COMORBIDITY WITH BODY MASS INDEX (BMI) OF 30.0 TO 30.9 IN ADULT: ICD-10-CM

## 2025-04-29 DIAGNOSIS — E66.811 CLASS 1 OBESITY DUE TO EXCESS CALORIES WITHOUT SERIOUS COMORBIDITY WITH BODY MASS INDEX (BMI) OF 30.0 TO 30.9 IN ADULT: ICD-10-CM

## 2025-04-29 DIAGNOSIS — I63.9 CEREBROVASCULAR ACCIDENT (CVA), UNSPECIFIED MECHANISM (MULTI): ICD-10-CM

## 2025-04-29 LAB
ATRIAL RATE: 64 BPM
P AXIS: 48 DEGREES
P OFFSET: 184 MS
P ONSET: 127 MS
PR INTERVAL: 160 MS
Q ONSET: 207 MS
QRS COUNT: 11 BEATS
QRS DURATION: 112 MS
QT INTERVAL: 414 MS
QTC CALCULATION(BAZETT): 427 MS
QTC FREDERICIA: 422 MS
R AXIS: -38 DEGREES
T AXIS: -1 DEGREES
T OFFSET: 414 MS
VENTRICULAR RATE: 64 BPM

## 2025-04-29 PROCEDURE — 1159F MED LIST DOCD IN RCRD: CPT | Performed by: FAMILY MEDICINE

## 2025-04-29 PROCEDURE — 3008F BODY MASS INDEX DOCD: CPT | Performed by: FAMILY MEDICINE

## 2025-04-29 PROCEDURE — 1036F TOBACCO NON-USER: CPT | Performed by: FAMILY MEDICINE

## 2025-04-29 PROCEDURE — 1111F DSCHRG MED/CURRENT MED MERGE: CPT | Performed by: FAMILY MEDICINE

## 2025-04-29 PROCEDURE — 99495 TRANSJ CARE MGMT MOD F2F 14D: CPT | Performed by: FAMILY MEDICINE

## 2025-04-29 ASSESSMENT — PATIENT HEALTH QUESTIONNAIRE - PHQ9
2. FEELING DOWN, DEPRESSED OR HOPELESS: NOT AT ALL
1. LITTLE INTEREST OR PLEASURE IN DOING THINGS: NOT AT ALL
SUM OF ALL RESPONSES TO PHQ9 QUESTIONS 1 AND 2: 0

## 2025-04-29 NOTE — TELEPHONE ENCOUNTER
Wendy from Dr. Alonso's office called to schedule a follow up visit.  Patient was discharged on 4/20 for CVA.  Do you want to see the patient or Deidre and when should I schedule asap, 1-2 wks, etc.?

## 2025-04-29 NOTE — PROGRESS NOTES
Subjective   Patient ID: Donnie Nogueira is a 65 y.o. male who presents for Hospital Follow-up.  [facility]    Hospital follow up. IAM complete.   Admitted to [place]. Admission dates 4/18/25-4/20/25. Admitted for CVA, stroke.   Days since discharge 9 days.   Patient had BW, EKG, MRI, and CT, .   Patient advised to follow up with Neurology, PCP and PT/OT.   Patient was prescribed asprin 81 mg, lisinopril 5 mg,      Patient states since being discharged that he is feel pretty good outside of some fatigue.     Review of Systems  Constitutional:  no chills, no fever and no night sweats.  Eyes: no blurred vision and no eyesight problems.  ENT: no hearing loss, no nasal congestion, no hoarseness and no sore throat.  Neck: no mass (es) and no swelling.  Cardiovascular: no chest pain, no intermittent leg claudication, no lower extremity edema, no palpitation and no syncope.  Respiratory: no cough, no shortness of breath during exertion, no shortness of breath at rest and no wheezing.  Gastrointestinal: no abdominal pain, no blood in stools, no constipation, no diarrhea, no melena, no nausea, no rectal pain and no vomiting.  Genitourinary: no dysuria, no change in urinary frequency, no urinary hesitancy and no feelings of urinary urgency.  Musculoskeletal: no arthralgias, no back pain and no myalgias.  Integumentary: no new skin lesions and no rashes.  Neurological: no difficulty walking, no headache, no limb weakness, no numbness and no tingling.  Psychiatric/Behavioral: no anxiety, no depression, no anhedonia and no substance use disorders.  Endocrine: no recent weight gain and no recent weight loss.  Hematologic/Lymphatic: no tendency for easy bruising and no swollen glands    Objective   Physical Exam  Patient in for follow-up admission to [facility] discharge for 24 lacunar infarct.  He had some right sided numbness tingling especially in the legs somewhat in the arm although all this is resolved.  Workup showed old  evidence of old infarct lacunar infarcts.  He does not recall having any other problems.  Well-developed well-nourished male in no acute distress no focal deficits today mental status normal mood and affect ANO x 3.  Advised to quit drinking or cut down is much as possible.  He was drinking at least 5 beers a day.  Physical exam today's office visit constitutional alert and oriented x3.    Head is atraumatic HEENT is within normal limits.    Neck supple no masses full range of motion.    Thyroid is normal in size no thyromegaly there is no carotid bruits.    Pulmonary exam shows clear to auscultation no respiratory distress.    Cardiovascular shows no murmur rub or gallop.  Regular rate and rhythm.    Abdominal exam soft nontender no hepatosplenomegaly or masses normal bowel sounds no rebound no guarding.    Musculoskeletal exam no joint pain no muscle pain full range of motion.    Psych exam normal mood and affect.    Dermatologic exam no skin lesions no rash no blemishes.    Neuro exam is no focal deficits.  Normal exam.    Extremities no edema normal pulses normal capillary refill.    There were no vitals taken for this visit.    Lab Results   Component Value Date    WBC 4.9 04/20/2025    HGB 13.3 (L) 04/20/2025    HCT 39.0 (L) 04/20/2025    MCV 92 04/20/2025     04/20/2025       Assessment/Plan plan continue current treatment recheck lipid panel 6 months he is due to see neurology and the original neurologist wanted him seen by movement disorder clinic Dr. Flako ro will go ahead and do that referral.  Await her evaluation recommendation.  Routine follow-up with me in 6 months  Problem List Items Addressed This Visit    None

## 2025-05-02 NOTE — TELEPHONE ENCOUNTER
Patient's wife, Gem (on HIPAA) called and states no one told her about the appointment for Donnie with Deidre Riggs for 5/13/25. Martha, in our office, states she told Donnie of the appointment.

## 2025-05-13 ENCOUNTER — APPOINTMENT (OUTPATIENT)
Dept: NEUROLOGY | Facility: CLINIC | Age: 65
End: 2025-05-13
Payer: MEDICARE

## 2025-05-13 VITALS
HEIGHT: 69 IN | SYSTOLIC BLOOD PRESSURE: 98 MMHG | BODY MASS INDEX: 29.16 KG/M2 | HEART RATE: 63 BPM | TEMPERATURE: 97.7 F | WEIGHT: 196.9 LBS | DIASTOLIC BLOOD PRESSURE: 64 MMHG

## 2025-05-13 DIAGNOSIS — I95.9 HYPOTENSION, UNSPECIFIED HYPOTENSION TYPE: ICD-10-CM

## 2025-05-13 DIAGNOSIS — Z86.73 RECENT CEREBROVASCULAR ACCIDENT (CVA): Primary | ICD-10-CM

## 2025-05-13 PROCEDURE — 1111F DSCHRG MED/CURRENT MED MERGE: CPT | Performed by: NURSE PRACTITIONER

## 2025-05-13 PROCEDURE — 99202 OFFICE O/P NEW SF 15 MIN: CPT | Performed by: NURSE PRACTITIONER

## 2025-05-13 PROCEDURE — 1159F MED LIST DOCD IN RCRD: CPT | Performed by: NURSE PRACTITIONER

## 2025-05-13 PROCEDURE — 1036F TOBACCO NON-USER: CPT | Performed by: NURSE PRACTITIONER

## 2025-05-13 PROCEDURE — 3008F BODY MASS INDEX DOCD: CPT | Performed by: NURSE PRACTITIONER

## 2025-05-13 PROCEDURE — 1160F RVW MEDS BY RX/DR IN RCRD: CPT | Performed by: NURSE PRACTITIONER

## 2025-05-13 PROCEDURE — G2211 COMPLEX E/M VISIT ADD ON: HCPCS | Performed by: NURSE PRACTITIONER

## 2025-05-13 ASSESSMENT — LIFESTYLE VARIABLES
HOW OFTEN DO YOU HAVE A DRINK CONTAINING ALCOHOL: 4 OR MORE TIMES A WEEK
AUDIT-C TOTAL SCORE: 4
HOW MANY STANDARD DRINKS CONTAINING ALCOHOL DO YOU HAVE ON A TYPICAL DAY: 1 OR 2
HOW OFTEN DO YOU HAVE SIX OR MORE DRINKS ON ONE OCCASION: NEVER
SKIP TO QUESTIONS 9-10: 1

## 2025-05-13 NOTE — PROGRESS NOTES
Subjective   Donnie Nogueira is a 65 y.o.   male.  Stroke      Pt presented to ED on 04/18 with right leg numbness as well as weakness in the right arm and leg (worse in leg). After unremarkable head CT and BP stabilization, he was given tNK and admitted to ICU. Head imaging showed evidence of acute lacunar stroke in the left corona radiata. He was discharged on aspirin 81mg daily and atorvastatin 40mg daily. He was instructed to participate in physical therapy and decrease his alcohol intake. He followed up with his PCP on 04/29.    Today, Pt states his symptoms have resolved- right leg numbness and right sided weakness. He has had no other stroke like symptoms including headache, dizziness, confusion, vision or speech changes, limb weakness or sensation changes. He has cut back on his beer consumption to about 1 or 2 per day. His blood pressure is on the lower end today (98/64) but he is asymptomatic, denying lightheadedness/dizziness, vision changes, trouble concentrating, upset stomach.     Objective   Neurological Exam  Mental Status  Awake, alert and oriented to person, place and time. Speech is normal. Language is fluent with no aphasia.  Naming and repetition intact..    Cranial Nerves  CN II: Visual fields full to confrontation.  CN III, IV, VI: Extraocular movements intact bilaterally. Pupils equal round and reactive to light bilaterally.  CN V: Facial sensation is normal.  CN VII: Full and symmetric facial movement.  CN VIII: Hearing is normal.  CN IX, X: Palate elevates symmetrically  CN XI: Shoulder shrug strength is normal.  CN XII: Tongue midline without atrophy or fasciculations.    Motor   Strength is 5/5 throughout all four extremities.    Sensory  Light touch is normal in upper and lower extremities.     Coordination  Right: Finger-to-nose normal.Left: Finger-to-nose normal.    Gait  Casual gait is normal including stance, stride, and arm swing.    Physical Exam  Eyes:      Extraocular Movements:  "Extraocular movements intact.      Pupils: Pupils are equal, round, and reactive to light.   Neurological:      Motor: Motor strength is normal.  Psychiatric:         Speech: Speech normal.       I personally reviewed laboratory, radiographic, and medical studies       Chemistry    Lab Results   Component Value Date/Time     04/20/2025 0359    K 3.5 04/20/2025 0359     04/20/2025 0359    CO2 23 04/20/2025 0359    BUN 10 04/20/2025 0359    CREATININE 0.76 04/20/2025 0359    Lab Results   Component Value Date/Time    CALCIUM 9.1 04/20/2025 0359    ALKPHOS 57 04/20/2025 0359    AST 16 04/20/2025 0359    ALT 15 04/20/2025 0359    BILITOT 0.8 04/20/2025 0359        Lab Results   Component Value Date    WBC 4.9 04/20/2025    HGB 13.3 (L) 04/20/2025    HCT 39.0 (L) 04/20/2025    MCV 92 04/20/2025     04/20/2025     Lab Results   Component Value Date    CHOL 209 (H) 04/20/2025    CHOL 290 (H) 04/18/2025     Lab Results   Component Value Date    HDL 47.2 04/20/2025    HDL 55.1 04/18/2025     Lab Results   Component Value Date    LDLCALC 100 (H) 04/20/2025    LDLCALC  04/18/2025      Comment:      The calculation of LDL and VLDL are inaccurate when the Triglycerides are greater than 400 mg/dL or when the patient is non-fasting. If LDL measurement is necessary contact the testing laboratory for an alternative LDL assay.                                  Near   Borderline      AGE      Desirable  Optimal    High     High     Very High     0-19 Y     0 - 109     ---    110-129   >/= 130     ----    20-24 Y     0 - 119     ---    120-159   >/= 160     ----      >24 Y     0 -  99   100-129  130-159   160-189     >/=190       Lab Results   Component Value Date    TRIG 311 (H) 04/20/2025    TRIG 508 (H) 04/18/2025     No components found for: \"CHOLHDL\"  Lab Results   Component Value Date    HGBA1C 4.8 04/18/2025     MR brain wo IV contrast  Result Date: 4/19/2025  MRI BRAIN - 1. A 13 mm focus of abnormally " restricted diffusion posterior leftward corona radiata consistent with acute to subacute type lacunar infarction. 2. Suspect old bilateral frontal corona radiata lacunar infarctions. 3. Mild white matter FLAIR signal increase most commonly seen with early chronic small vessel ischemic change. 4. Degree of ventriculomegaly is commensurate with overall degree of brain parenchymal volume loss, which is mild.   MRA HEAD - 1. No evidence of large arterial occlusion or evident aneurysm.   MRA NECK - 1. No evidence of occlusion or severe stenosis in the bilateral carotid and vertebral arterial systems.   MACRO: None   Signed by: Donnie Agarwal 4/19/2025 5:11 PM Dictation workstation:   [id number]     Assessment/Plan   Assessment & Plan  Recent cerebrovascular accident (CVA)  - Left lacunar in corona radiata   - Continue aspirin 81mg daily  - Continue atorvastatin 40mg daily  - Continue to monitor and manage blood pressure.  - No need for therapy services as he has returned to baseline.     Vascular Risk Factor modification goals:  Patient and spouse education on signs and symptoms of stroke, calling 911, healthy strategies for stroke prevention.    Long term goals:  B/P < 120/70mmHG  HgbA1c <5.6  LDL < 70  Triglycerides < 150  Weight loss with BMI < 25      Hypotension, unspecified hypotension type  - Today's reading is 98/64- asymptomatic  - Instructed to increase water intake.  - Instructed to monitor BP readings at home and if BP remains on the lower side, contact PCP Dr. Alonso. Today, pt will check blood pressure prior to his 5 PM dose of lisinopril and if systolic reading is less than 100, he will hold today's dose only.   - Change positions slowly    Follow up with GHADA Hinoojsa-CNP as needed.  Encouraged pt and his spouse to call or message me with any questions or concerns.

## 2025-05-13 NOTE — ASSESSMENT & PLAN NOTE
- Today's reading is 98/64- asymptomatic  - Instructed to increase water intake.  - Instructed to monitor BP readings at home and if BP remains on the lower side, contact PCP Dr. Alonso. Today, pt will check blood pressure prior to his 5 PM dose of lisinopril and if systolic reading is less than 100, he will hold today's dose only.   - Change positions slowly    Follow up with GHADA Hinojosa-CNP as needed.  Encouraged pt and his spouse to call or message me with any questions or concerns.

## 2025-05-13 NOTE — ASSESSMENT & PLAN NOTE
- Left lacunar in corona radiata   - Continue aspirin 81mg daily  - Continue atorvastatin 40mg daily  - Continue to monitor and manage blood pressure.  - No need for therapy services as he has returned to baseline.     Vascular Risk Factor modification goals:  Patient and spouse education on signs and symptoms of stroke, calling 911, healthy strategies for stroke prevention.    Long term goals:  B/P < 120/70mmHG  HgbA1c <5.6  LDL < 70  Triglycerides < 150  Weight loss with BMI < 25

## 2025-06-18 DIAGNOSIS — I10 HYPERTENSION, UNSPECIFIED TYPE: ICD-10-CM

## 2025-06-18 DIAGNOSIS — I63.9 CEREBROVASCULAR ACCIDENT (CVA), UNSPECIFIED MECHANISM (MULTI): ICD-10-CM

## 2025-06-18 RX ORDER — ASPIRIN 81 MG/1
81 TABLET ORAL DAILY
Qty: 90 TABLET | Refills: 1 | Status: SHIPPED | OUTPATIENT
Start: 2025-06-18 | End: 2025-12-15

## 2025-06-18 RX ORDER — ATORVASTATIN CALCIUM 40 MG/1
40 TABLET, FILM COATED ORAL DAILY
Qty: 90 TABLET | Refills: 1 | Status: SHIPPED | OUTPATIENT
Start: 2025-06-18 | End: 2025-08-17

## 2025-06-18 RX ORDER — LISINOPRIL 5 MG/1
5 TABLET ORAL DAILY
Qty: 90 TABLET | Refills: 1 | Status: SHIPPED | OUTPATIENT
Start: 2025-06-18 | End: 2025-08-17

## 2025-06-18 NOTE — TELEPHONE ENCOUNTER
Rx Refill Request Telephone Encounter    Name:  Donnie Nogueira  : 1960     aspirin 81 mg EC tablet [[id number]]    Order Details    Dose: 81 mg Route: oral Frequency: Daily   Dispense Quantity: 90 tablet (30 day supply) Refills: 1    Duration:  Dispense As Written: No          Sig: Take 1 tablet (81 mg) by mouth once daily.           lisinopril 5 mg tablet [[id number]]    Order Details    Dose: 5 mg Route: oral Frequency: Daily   Dispense Quantity: 90 tablet (90 day supply) Refills: 1    Duration:  Dispense As Written: No          Sig: Take 1 tablet (5 mg) by mouth once daily.           atorvastatin (Lipitor) 40 mg tablet [[id number]]    Order Details  Dose: 40 mg Route: oral Frequency: Daily   Dispense Quantity: 90 tablet (90 day supply) Refills: 1    Duration:  Dispense As Written: No          Sig: Take 1 tablet (40 mg) by mouth once daily.     ALLERGIES:   NKDA    Specific Pharmacy location:  [facility]    Date of last appointment:  2025  Date of next appointment:  NONE    Best number to reach patient:  [phone]

## 2025-07-29 DIAGNOSIS — I63.9 CEREBROVASCULAR ACCIDENT (CVA), UNSPECIFIED MECHANISM (MULTI): ICD-10-CM

## 2025-07-29 DIAGNOSIS — E78.2 MIXED HYPERLIPIDEMIA: ICD-10-CM

## 2025-07-29 LAB
CHOLEST SERPL-MCNC: 127 MG/DL
CHOLEST/HDLC SERPL: 2.2 (CALC)
HDLC SERPL-MCNC: 58 MG/DL
LDLC SERPL CALC-MCNC: 47 MG/DL (CALC)
NONHDLC SERPL-MCNC: 69 MG/DL (CALC)
TRIGL SERPL-MCNC: 132 MG/DL

## 2025-07-30 ENCOUNTER — RESULTS FOLLOW-UP (OUTPATIENT)
Dept: PRIMARY CARE | Facility: CLINIC | Age: 65
End: 2025-07-30
Payer: MEDICARE

## 2025-08-01 NOTE — TELEPHONE ENCOUNTER
----- Message from Maxime Alonso sent at 7/30/2025  8:11 AM EDT -----  Lipid panel now completely normal on the medication.  Plan continue atorvastatin and recheck in 1 year.  ----- Message -----  From: [facility] Results In  Sent: 7/29/2025   9:53 PM EDT  To: Maxime Alonso MD

## 2025-09-02 ENCOUNTER — TELEPHONE (OUTPATIENT)
Dept: PRIMARY CARE | Facility: CLINIC | Age: 65
End: 2025-09-02
Payer: MEDICARE